# Patient Record
Sex: FEMALE | Race: ASIAN | NOT HISPANIC OR LATINO
[De-identification: names, ages, dates, MRNs, and addresses within clinical notes are randomized per-mention and may not be internally consistent; named-entity substitution may affect disease eponyms.]

---

## 2017-05-10 ENCOUNTER — APPOINTMENT (OUTPATIENT)
Dept: CARDIOLOGY | Facility: CLINIC | Age: 47
End: 2017-05-10

## 2017-05-17 ENCOUNTER — NON-APPOINTMENT (OUTPATIENT)
Age: 47
End: 2017-05-17

## 2017-05-17 ENCOUNTER — APPOINTMENT (OUTPATIENT)
Dept: CARDIOLOGY | Facility: CLINIC | Age: 47
End: 2017-05-17

## 2017-05-17 VITALS
BODY MASS INDEX: 29.59 KG/M2 | OXYGEN SATURATION: 98 % | SYSTOLIC BLOOD PRESSURE: 144 MMHG | RESPIRATION RATE: 17 BRPM | WEIGHT: 167 LBS | TEMPERATURE: 98.8 F | HEART RATE: 75 BPM | DIASTOLIC BLOOD PRESSURE: 89 MMHG | HEIGHT: 63 IN

## 2018-10-03 ENCOUNTER — APPOINTMENT (OUTPATIENT)
Dept: CARDIOLOGY | Facility: CLINIC | Age: 48
End: 2018-10-03
Payer: MEDICAID

## 2018-10-03 ENCOUNTER — NON-APPOINTMENT (OUTPATIENT)
Age: 48
End: 2018-10-03

## 2018-10-03 VITALS
RESPIRATION RATE: 17 BRPM | HEART RATE: 69 BPM | TEMPERATURE: 98.3 F | SYSTOLIC BLOOD PRESSURE: 152 MMHG | WEIGHT: 163 LBS | OXYGEN SATURATION: 98 % | BODY MASS INDEX: 28.87 KG/M2 | DIASTOLIC BLOOD PRESSURE: 95 MMHG

## 2018-10-03 PROCEDURE — 93306 TTE W/DOPPLER COMPLETE: CPT

## 2018-10-03 PROCEDURE — ZZZZZ: CPT

## 2018-10-03 PROCEDURE — 93015 CV STRESS TEST SUPVJ I&R: CPT

## 2018-10-03 PROCEDURE — 99214 OFFICE O/P EST MOD 30 MIN: CPT | Mod: 25

## 2018-10-03 PROCEDURE — 93000 ELECTROCARDIOGRAM COMPLETE: CPT | Mod: 59

## 2018-10-05 ENCOUNTER — RESULT REVIEW (OUTPATIENT)
Age: 48
End: 2018-10-05

## 2021-06-14 ENCOUNTER — APPOINTMENT (OUTPATIENT)
Dept: CARDIOLOGY | Facility: CLINIC | Age: 51
End: 2021-06-14
Payer: MEDICAID

## 2021-06-14 VITALS
HEART RATE: 77 BPM | OXYGEN SATURATION: 98 % | TEMPERATURE: 97.3 F | DIASTOLIC BLOOD PRESSURE: 77 MMHG | WEIGHT: 166 LBS | SYSTOLIC BLOOD PRESSURE: 127 MMHG | RESPIRATION RATE: 17 BRPM | BODY MASS INDEX: 29.41 KG/M2

## 2021-06-14 PROCEDURE — 93306 TTE W/DOPPLER COMPLETE: CPT

## 2021-06-14 PROCEDURE — 99072 ADDL SUPL MATRL&STAF TM PHE: CPT

## 2021-06-14 PROCEDURE — 93015 CV STRESS TEST SUPVJ I&R: CPT

## 2021-06-14 PROCEDURE — ZZZZZ: CPT

## 2021-06-14 PROCEDURE — 99214 OFFICE O/P EST MOD 30 MIN: CPT | Mod: 25

## 2021-06-14 NOTE — PHYSICAL EXAM
[General Appearance - Well Developed] : well developed [Normal Appearance] : normal appearance [Well Groomed] : well groomed [General Appearance - Well Nourished] : well nourished [No Deformities] : no deformities [General Appearance - In No Acute Distress] : no acute distress [Normal Conjunctiva] : the conjunctiva exhibited no abnormalities [Eyelids - No Xanthelasma] : the eyelids demonstrated no xanthelasmas [Normal Oral Mucosa] : normal oral mucosa [No Oral Pallor] : no oral pallor [No Oral Cyanosis] : no oral cyanosis [Normal Jugular Venous A Waves Present] : normal jugular venous A waves present [Normal Jugular Venous V Waves Present] : normal jugular venous V waves present [No Jugular Venous Griffiths A Waves] : no jugular venous griffiths A waves [Respiration, Rhythm And Depth] : normal respiratory rhythm and effort [Exaggerated Use Of Accessory Muscles For Inspiration] : no accessory muscle use [Auscultation Breath Sounds / Voice Sounds] : lungs were clear to auscultation bilaterally [Heart Rate And Rhythm] : heart rate and rhythm were normal [Heart Sounds] : normal S1 and S2 [Murmurs] : no murmurs present [Arterial Pulses Normal] : the arterial pulses were normal [Edema] : no peripheral edema present [Abdomen Soft] : soft [Abdomen Tenderness] : non-tender [Abdomen Mass (___ Cm)] : no abdominal mass palpated [Abnormal Walk] : normal gait [Gait - Sufficient For Exercise Testing] : the gait was sufficient for exercise testing [Nail Clubbing] : no clubbing of the fingernails [Cyanosis, Localized] : no localized cyanosis [Petechial Hemorrhages (___cm)] : no petechial hemorrhages [] : no ischemic changes [Oriented To Time, Place, And Person] : oriented to person, place, and time [Affect] : the affect was normal [Mood] : the mood was normal [No Anxiety] : not feeling anxious

## 2021-06-14 NOTE — REASON FOR VISIT
[Symptom and Test Evaluation] : symptom and test evaluation [FreeTextEntry1] : 10/3/18 - Patient reports that for the past 3 weeks she has been experiencing right-sided  chest discomfort, described as tightness, not related to exertion or to meals, non-tender to touch, worse when she is stressed. Patient denies SOB.  Patient denies palpitations.  Patient denies lightheadedness.

## 2021-06-14 NOTE — HISTORY OF PRESENT ILLNESS
[FreeTextEntry1] : 50-year-old female with HLD presents for followup.  \par \par Patient was last seen on 10/3/18 for chest pain.  Patient underwent an echocardiogram and it showed normal LV function without significant valvular pathology.  Patient underwent a treadmill stress test and completed 9 minutes of Brennon protocol.  There were upsloping ST depressions on ECG but no symptoms.  Patient underwent a CXR and it showed no lung pathology. Her symptom was felt to be GI in etiology.

## 2021-06-14 NOTE — DISCUSSION/SUMMARY
[FreeTextEntry1] : 48-year-old female with HLD presents for followup.  Patient was last seen on 5/17/17 for evaluation of chest pain.  Patient underwent an echocardiogram and it showed normal LV function without significant valvular pathology. Patient underwent a treadmill stress test and completed 9.5 minutes of Brennon protocol. There were upsloping ST depressions on ECG but no symptoms. Patient was felt to be at low risk for having significant CAD.  Patient reports that for the past 3 weeks she has been experiencing right-sided  chest discomfort, described as tightness, not related to exertion or to meals, non-tender to touch, worse when she is stressed. Patient denies SOB.  Patient denies palpitations.  Patient denies lightheadedness. \par \par (1) Chest discomfort, non-exertion - Patient underwent an echocardiogram and it showed normal LV function without significant valvular pathology. Patient underwent a treadmill stress test and completed 9 minutes of Brennon protocol.  There were upsloping ST depressions on ECG but no symptoms.  Patient appears to be at low risk for having significant CAD.  Patient underwent a CXR and it showed no lung pathology.  Patient was reassured.  Her symptom may be GI in etiology.  I advised patient to consider PPI.\par \par (2) Followup - as needed. \par

## 2021-06-23 ENCOUNTER — NON-APPOINTMENT (OUTPATIENT)
Age: 51
End: 2021-06-23

## 2021-07-02 ENCOUNTER — APPOINTMENT (OUTPATIENT)
Dept: INTERNAL MEDICINE | Facility: CLINIC | Age: 51
End: 2021-07-02
Payer: MEDICAID

## 2021-07-02 VITALS
SYSTOLIC BLOOD PRESSURE: 134 MMHG | OXYGEN SATURATION: 99 % | WEIGHT: 166 LBS | BODY MASS INDEX: 29.41 KG/M2 | HEART RATE: 79 BPM | RESPIRATION RATE: 17 BRPM | DIASTOLIC BLOOD PRESSURE: 80 MMHG | HEIGHT: 63 IN | TEMPERATURE: 98 F

## 2021-07-02 VITALS — SYSTOLIC BLOOD PRESSURE: 130 MMHG | DIASTOLIC BLOOD PRESSURE: 75 MMHG

## 2021-07-02 DIAGNOSIS — R03.0 ELEVATED BLOOD-PRESSURE READING, W/OUT DIAGNOSIS OF HYPERTENSION: ICD-10-CM

## 2021-07-02 DIAGNOSIS — M25.40 EFFUSION, UNSPECIFIED JOINT: ICD-10-CM

## 2021-07-02 DIAGNOSIS — M25.50 PAIN IN UNSPECIFIED JOINT: ICD-10-CM

## 2021-07-02 DIAGNOSIS — Z00.00 ENCOUNTER FOR GENERAL ADULT MEDICAL EXAMINATION W/OUT ABNORMAL FINDINGS: ICD-10-CM

## 2021-07-02 DIAGNOSIS — R07.89 OTHER CHEST PAIN: ICD-10-CM

## 2021-07-02 PROCEDURE — 99072 ADDL SUPL MATRL&STAF TM PHE: CPT

## 2021-07-02 PROCEDURE — 99203 OFFICE O/P NEW LOW 30 MIN: CPT | Mod: 25

## 2021-07-02 PROCEDURE — 99386 PREV VISIT NEW AGE 40-64: CPT

## 2021-07-02 RX ORDER — METFORMIN HYDROCHLORIDE 500 MG/1
500 TABLET, COATED ORAL DAILY
Qty: 30 | Refills: 2 | Status: DISCONTINUED | COMMUNITY
Start: 2021-07-02 | End: 2021-07-02

## 2021-07-07 ENCOUNTER — LABORATORY RESULT (OUTPATIENT)
Age: 51
End: 2021-07-07

## 2021-07-07 ENCOUNTER — APPOINTMENT (OUTPATIENT)
Dept: OBGYN | Facility: CLINIC | Age: 51
End: 2021-07-07
Payer: MEDICAID

## 2021-07-07 ENCOUNTER — OUTPATIENT (OUTPATIENT)
Dept: OUTPATIENT SERVICES | Facility: HOSPITAL | Age: 51
LOS: 1 days | End: 2021-07-07
Payer: COMMERCIAL

## 2021-07-07 ENCOUNTER — TRANSCRIPTION ENCOUNTER (OUTPATIENT)
Age: 51
End: 2021-07-07

## 2021-07-07 VITALS
RESPIRATION RATE: 18 BRPM | WEIGHT: 170 LBS | HEIGHT: 62 IN | SYSTOLIC BLOOD PRESSURE: 120 MMHG | OXYGEN SATURATION: 99 % | DIASTOLIC BLOOD PRESSURE: 76 MMHG | HEART RATE: 86 BPM | BODY MASS INDEX: 31.28 KG/M2

## 2021-07-07 DIAGNOSIS — Z11.3 ENCOUNTER FOR SCREENING FOR INFECTIONS WITH A PREDOMINANTLY SEXUAL MODE OF TRANSMISSION: ICD-10-CM

## 2021-07-07 DIAGNOSIS — Z01.419 ENCOUNTER FOR GYNECOLOGICAL EXAMINATION (GENERAL) (ROUTINE) W/OUT ABNORMAL FINDINGS: ICD-10-CM

## 2021-07-07 DIAGNOSIS — Z83.3 FAMILY HISTORY OF DIABETES MELLITUS: ICD-10-CM

## 2021-07-07 DIAGNOSIS — Z12.39 ENCOUNTER FOR OTHER SCREENING FOR MALIGNANT NEOPLASM OF BREAST: ICD-10-CM

## 2021-07-07 DIAGNOSIS — Z12.4 ENCOUNTER FOR SCREENING FOR MALIGNANT NEOPLASM OF CERVIX: ICD-10-CM

## 2021-07-07 DIAGNOSIS — Z82.3 FAMILY HISTORY OF STROKE: ICD-10-CM

## 2021-07-07 DIAGNOSIS — Z00.00 ENCOUNTER FOR GENERAL ADULT MEDICAL EXAMINATION WITHOUT ABNORMAL FINDINGS: ICD-10-CM

## 2021-07-07 DIAGNOSIS — Z12.11 ENCOUNTER FOR SCREENING FOR MALIGNANT NEOPLASM OF COLON: ICD-10-CM

## 2021-07-07 PROCEDURE — 99203 OFFICE O/P NEW LOW 30 MIN: CPT

## 2021-07-07 PROCEDURE — 87491 CHLMYD TRACH DNA AMP PROBE: CPT

## 2021-07-07 PROCEDURE — 87591 N.GONORRHOEAE DNA AMP PROB: CPT

## 2021-07-07 PROCEDURE — 36415 COLL VENOUS BLD VENIPUNCTURE: CPT

## 2021-07-07 PROCEDURE — 87800 DETECT AGNT MULT DNA DIREC: CPT

## 2021-07-07 PROCEDURE — G0463: CPT

## 2021-07-07 PROCEDURE — 87624 HPV HI-RISK TYP POOLED RSLT: CPT

## 2021-07-07 NOTE — HISTORY OF PRESENT ILLNESS
[N] : Patient is not sexually active [Y] : Positive pregnancy history [Mammogramdate] : 2020 [PapSmeardate] : 2020 [LMPDate] : 6/10/21 [PGHxTotal] : 2 [[facility]exFulerm] : 1 [PGHxPremature] : 0 [PGHxAbortions] : 1 [[facility] Center[facility]] : 1 [PGHxABInduced] : 0 [PGHxABSpont] : 0 [PGHxEctopic] : 0 [PGHxMultBirths] : 0 [Normal Amount/Duration] :  normal amount and duration [Regular Cycle Intervals] : periods have been regular [Frequency: Q ___ days] : menstrual periods occur approximately every [unfilled] days [Menarche Age: ____] : age at menarche was [unfilled] [FreeTextEntry1] : 06/10/21 [Previously active] : previously active [Men] : men

## 2021-07-07 NOTE — HISTORY OF PRESENT ILLNESS
[N] : Patient is not sexually active [Y] : Positive pregnancy history [Mammogramdate] : 2020 [PapSmeardate] : 2020 [LMPDate] : 6/10/21 [PGHxTotal] : 2 [[facility]ulerm] : 1 [PGHxPremature] : 0 [PGHxAbortions] : 1 [[facility])iving] : 1 [PGHxABInduced] : 0 [PGHxABSpont] : 0 [PGHxEctopic] : 0 [PGHxMultBirths] : 0 [Normal Amount/Duration] :  normal amount and duration [Regular Cycle Intervals] : periods have been regular [Frequency: Q ___ days] : menstrual periods occur approximately every [unfilled] days [Menarche Age: ____] : age at menarche was [unfilled] [FreeTextEntry1] : 06/10/21 [Previously active] : previously active [Men] : men

## 2021-07-08 DIAGNOSIS — Z82.3 FAMILY HISTORY OF STROKE: ICD-10-CM

## 2021-07-08 DIAGNOSIS — N39.3 STRESS INCONTINENCE (FEMALE) (MALE): ICD-10-CM

## 2021-07-08 DIAGNOSIS — Z11.3 ENCOUNTER FOR SCREENING FOR INFECTIONS WITH A PREDOMINANTLY SEXUAL MODE OF TRANSMISSION: ICD-10-CM

## 2021-07-08 DIAGNOSIS — E78.5 HYPERLIPIDEMIA, UNSPECIFIED: ICD-10-CM

## 2021-07-08 DIAGNOSIS — I10 ESSENTIAL (PRIMARY) HYPERTENSION: ICD-10-CM

## 2021-07-08 DIAGNOSIS — F32.9 MAJOR DEPRESSIVE DISORDER, SINGLE EPISODE, UNSPECIFIED: ICD-10-CM

## 2021-07-08 DIAGNOSIS — Z12.4 ENCOUNTER FOR SCREENING FOR MALIGNANT NEOPLASM OF CERVIX: ICD-10-CM

## 2021-07-08 DIAGNOSIS — Z83.3 FAMILY HISTORY OF DIABETES MELLITUS: ICD-10-CM

## 2021-07-08 DIAGNOSIS — Z01.419 ENCOUNTER FOR GYNECOLOGICAL EXAMINATION (GENERAL) (ROUTINE) WITHOUT ABNORMAL FINDINGS: ICD-10-CM

## 2021-07-08 DIAGNOSIS — E11.9 TYPE 2 DIABETES MELLITUS WITHOUT COMPLICATIONS: ICD-10-CM

## 2021-07-09 ENCOUNTER — NON-APPOINTMENT (OUTPATIENT)
Age: 51
End: 2021-07-09

## 2021-07-09 DIAGNOSIS — N76.1 SUBACUTE AND CHRONIC VAGINITIS: ICD-10-CM

## 2021-07-09 LAB
C TRACH RRNA SPEC QL NAA+PROBE: NOT DETECTED
HPV HIGH+LOW RISK DNA PNL CVX: NOT DETECTED
N GONORRHOEA RRNA SPEC QL NAA+PROBE: NOT DETECTED
SOURCE TP AMPLIFICATION: NORMAL

## 2021-07-12 ENCOUNTER — APPOINTMENT (OUTPATIENT)
Dept: ENDOCRINOLOGY | Facility: CLINIC | Age: 51
End: 2021-07-12
Payer: MEDICAID

## 2021-07-12 PROCEDURE — 97802 MEDICAL NUTRITION INDIV IN: CPT

## 2021-07-12 PROCEDURE — 99072 ADDL SUPL MATRL&STAF TM PHE: CPT

## 2021-07-13 LAB — CYTOLOGY CVX/VAG DOC THIN PREP: NORMAL

## 2021-07-21 ENCOUNTER — APPOINTMENT (OUTPATIENT)
Dept: PODIATRY | Facility: CLINIC | Age: 51
End: 2021-07-21

## 2021-07-27 ENCOUNTER — APPOINTMENT (OUTPATIENT)
Dept: UROGYNECOLOGY | Facility: CLINIC | Age: 51
End: 2021-07-27
Payer: MEDICAID

## 2021-07-27 VITALS
BODY MASS INDEX: 29.44 KG/M2 | TEMPERATURE: 97.2 F | HEART RATE: 70 BPM | HEIGHT: 62 IN | DIASTOLIC BLOOD PRESSURE: 76 MMHG | SYSTOLIC BLOOD PRESSURE: 135 MMHG | WEIGHT: 160 LBS

## 2021-07-27 DIAGNOSIS — R35.0 FREQUENCY OF MICTURITION: ICD-10-CM

## 2021-07-27 LAB
BILIRUB UR QL STRIP: NORMAL
CLARITY UR: CLEAR
COLLECTION METHOD: NORMAL
GLUCOSE UR-MCNC: 100
HCG UR QL: 0.2 EU/DL
HGB UR QL STRIP.AUTO: NORMAL
KETONES UR-MCNC: NORMAL
LEUKOCYTE ESTERASE UR QL STRIP: NORMAL
NITRITE UR QL STRIP: NORMAL
PH UR STRIP: 5.5
PROT UR STRIP-MCNC: NORMAL
SP GR UR STRIP: 1.03

## 2021-07-27 PROCEDURE — 99072 ADDL SUPL MATRL&STAF TM PHE: CPT

## 2021-07-27 PROCEDURE — 51725 SIMPLE CYSTOMETROGRAM: CPT

## 2021-07-27 PROCEDURE — 51736 URINE FLOW MEASUREMENT: CPT

## 2021-07-27 PROCEDURE — 99205 OFFICE O/P NEW HI 60 MIN: CPT | Mod: 25

## 2021-07-27 NOTE — HISTORY OF PRESENT ILLNESS
[FreeTextEntry1] : \par \par Translater gely #[id number]  Ieve\par \par This is a 51-year-old woman with a chief complaint of urinary incontinence. Stress and urge symptoms are both present.\par \par She was previously seen by Dr. Ellis and did not respond to oxybutinin.  At the time she declined surgery\par \par On examination the general examination is normal. External genitalia are normal. No evidence of prolapse.  Vagina normal. \par \par Urine neg except for glucose\par \par \par PROCEDURE: SIMPLE CYSTOMETRY\par \par In the supine position, the urethra was prepped with Betadine. A 16 Jordanian catheter was gently passed into the bladder with sterile technique and secured in place. Sterile water was infused by gravity via an irrigation syringe. Patient sensation, change in flow rate, and capacity were observed: \par \par \par First Sensation  50 cc\par , First Urgency (cc): 150___________\par Increased Urgency  180\par Maximal capacity (cc): __330___________\par Pain will filling: NEGATIVE\par Sensory Urgency: strong positive\par Post Fill Void (cc):   ______________\par \par Cough test negative\par \par Involuntary detrusor contractions:   equivocal favoring contractions present\par \par Specific diagnosis for etiology of incontinence is not distinctly clear from simple testing as indication for cystometry.  Instructions given for kegel exercises for stress incontinence. Counseled on sling but declines surgery\par \par This patient has signs and symptoms consistent with an overactive bladder. By history has stress incontinence as well - not demonstrated today.  Declines surgery  We discussed the nature of the condition and is discussed stress incontinence and overactive bladder, as well as mixed conditions of incontinence.\par \par I recommended her treatment regimen to include:\par \par Timed voiding\par Dietary modifications including restrictions on caffeine, diet sweetener, spicy, alcohol, citrus\par We discussed normal hydration and avoiding overhydration\par For general bladder health I recommended Cranberry tabs 400 mg twice daily.\par \par She was offered to try the above regimen for a few weeks first or concomitantly to begin Sanctura 60 mg xl with a stool softener at the start. Risks, benefits, and adverse reactions for the medication were reviewed.\par \par We will follow up  up in 4-6 weeks\par \par \par \par IMPRESSION\par

## 2021-08-09 ENCOUNTER — APPOINTMENT (OUTPATIENT)
Dept: INTERNAL MEDICINE | Facility: CLINIC | Age: 51
End: 2021-08-09
Payer: MEDICAID

## 2021-08-09 VITALS
DIASTOLIC BLOOD PRESSURE: 84 MMHG | WEIGHT: 166 LBS | OXYGEN SATURATION: 98 % | HEIGHT: 62 IN | SYSTOLIC BLOOD PRESSURE: 137 MMHG | HEART RATE: 87 BPM | BODY MASS INDEX: 30.55 KG/M2 | RESPIRATION RATE: 16 BRPM | TEMPERATURE: 97.2 F

## 2021-08-09 DIAGNOSIS — N39.3 STRESS INCONTINENCE (FEMALE) (MALE): ICD-10-CM

## 2021-08-09 DIAGNOSIS — A04.8 OTHER SPECIFIED BACTERIAL INTESTINAL INFECTIONS: ICD-10-CM

## 2021-08-09 PROCEDURE — 99072 ADDL SUPL MATRL&STAF TM PHE: CPT

## 2021-08-09 PROCEDURE — 99214 OFFICE O/P EST MOD 30 MIN: CPT

## 2021-08-09 NOTE — ASSESSMENT
[FreeTextEntry1] : COVID19 SHOT X2 LAST SHOT IN 03/2021\par \par lab as ordered \par \par anxiety and depression: \par pt is also anxious during the exam; \par \par \par htn: COTNINUE WITH CURRENT AMLODIPINE AND LOSARTAN\par BP log\par 1 month follow up \par \par \par DMII: \par \par continue with current meds \par healthy diet and exercise\par refer to podiatry and dietitian\par \par \par chest pain; had work up done by cardio with TTE and stress and EKG and cxr  grossly wnl \par \par joints pain and swelling : \par \par follow up with labs to r/o rheumatoid disease \par \par \par thumb pain and stiffness: \par no red flag symptoms\par follow up with X ray;\par diclofenac cream as needed\par  ice and rest;\par \par \par thyroid us and labs for thyroid nodule \par \par 1 months follow up\par \par  thyroid nodule;\par \par follow up with TFT \par thyroid US\par \par \par advised pt tobring colonoscopy and egd reporte next visit she had it done in 01/2020

## 2021-08-09 NOTE — HISTORY OF PRESENT ILLNESS
[de-identified] : 51 y.o chinese speaking F w/pmhX of prior CP, URINARY incontinent , htn, hld, DMII  HER TO ESTABLISH CARe AND cpe\par \par PRIOR PCP WAS ( [phone]) in [place]; \par \par Chest pain saw cardio . Patient underwent an echocardiogram and it showed normal LV function without significant valvular pathology. Patient underwent a treadmill stress test and completed 9.5 minutes of Brennon protocol. There were upsloping ST depressions on ECG but no symptoms. Patient was felt to be at low risk for having significant CAD and CXR negative;\par last TTE in 06/2021 wnl \par \par dmii: LAST TIME SAW Ophthal WAS IN 03/2021; normal per pt; has not see podiatry or dietitian \par \par HTN: home BP log with BP 110s-130s/ 80s ; denies of nay symptoms \par \par HLD: was on fish oil \par \par B/L HAND JOINTS PAIN AND THUMB PAIN: for 1 -2 months; was seeing Dr.Wensong Dutta PMR( [phone]) WAS on PHYSICAL Therapy; AND ON Gabapentin; PT WAS NOT ABLE TO TOLERATE WITH Gabapentin; SHE had SOB AND cp WHEN SHE WAS ON Gabapentin; NO TRAUMA OR INJURY ;NO Weakness OR Numbness; \par \par HAD X RAY LUMBAR ADN CERVICAL DONE BY pmr IN 06/2021 WITH GROSSLY NEGATIVE RESULTS Besides SOME MILD Degenerative CHANGE OF Lumbar DISC \par \par WAS ON STRESS DUE TO PT'S STEP Parent  WAS SICK IN FAMILY\par \par anxiety and DEPRESSION : GAD7 =17  phq 7 =11 no jonathan/ no hallucination/ No delusions /no harmful ideation/ HAS Suicidal IDEATION ONCE BUT NO SPECIFIC PLAN \par

## 2021-08-09 NOTE — HEALTH RISK ASSESSMENT
[Employed] : employed [1 or 2 (0 pts)] : 1 or 2 (0 points) [Never (0 pts)] : Never (0 points) [No] : In the past 12 months have you used drugs other than those required for medical reasons? No [1] : 1) Little interest or pleasure doing things for several days (1) [3] : 2) Feeling down, depressed, or hopeless for nearly every day (3) [Patient reported mammogram was normal] : Patient reported mammogram was normal [Patient reported PAP Smear was normal] : Patient reported PAP Smear was normal [HIV test declined] : HIV test declined [Hepatitis C test offered] : Hepatitis C test offered [] :  [Feels Safe at Home] : Feels safe at home [Patient reported colonoscopy was normal] : Patient reported colonoscopy was normal [] : No [[id number]] : 4 [Change in mental status noted] : No change in mental status noted [Language] : denies difficulty with language [Behavior] : denies difficulty with behavior [Learning/Retaining New Information] : denies difficulty learning/retaining new information [Handling Complex Tasks] : denies difficulty handling complex tasks [Reasoning] : denies difficulty with reasoning [Spatial Ability and Orientation] : denies difficulty with spatial ability and orientation [Sexually Active] : not sexually active [High Risk Behavior] : no high risk behavior [MammogramDate] : 06/2020 [PapSmearDate] : 01/2020 [ColonoscopyDate] : 01/2020 [ColonoscopyComments] : NORMAL PER PT;  [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

## 2021-08-09 NOTE — REVIEW OF SYSTEMS
Name from pharmacy: Lisinopril Oral Tablet 5 MG         Will file in chart as: LISINOPRIL 5 MG Oral Tab    Sig: TAKE 1 TABLET BY MOUTH ONE TIME A DAY     Disp:  90 tablet    Refills:  0    Start: 6/27/2021    Class: Normal    Non-formulary    Last ordered [Negative] : Heme/Lymph

## 2021-08-09 NOTE — PHYSICAL EXAM
[No Acute Distress] : no acute distress [Well Nourished] : well nourished [Well Developed] : well developed [Well-Appearing] : well-appearing [Normal Sclera/Conjunctiva] : normal sclera/conjunctiva [PERRL] : pupils equal round and reactive to light [EOMI] : extraocular movements intact [Normal Outer Ear/Nose] : the outer ears and nose were normal in appearance [Normal Oropharynx] : the oropharynx was normal [No JVD] : no jugular venous distention [No Lymphadenopathy] : no lymphadenopathy [Supple] : supple [Thyroid Normal, No Nodules] : the thyroid was normal and there were no nodules present [No Respiratory Distress] : no respiratory distress  [No Accessory Muscle Use] : no accessory muscle use [Clear to Auscultation] : lungs were clear to auscultation bilaterally [Normal Rate] : normal rate  [Regular Rhythm] : with a regular rhythm [Normal S1, S2] : normal S1 and S2 [No Murmur] : no murmur heard [No Carotid Bruits] : no carotid bruits [No Abdominal Bruit] : a ~M bruit was not heard ~T in the abdomen [No Varicosities] : no varicosities [Pedal Pulses Present] : the pedal pulses are present [No Edema] : there was no peripheral edema [No Palpable Aorta] : no palpable aorta [No Extremity Clubbing/Cyanosis] : no extremity clubbing/cyanosis [Soft] : abdomen soft [Non Tender] : non-tender [Non-distended] : non-distended [No Masses] : no abdominal mass palpated [No HSM] : no HSM [Normal Bowel Sounds] : normal bowel sounds [Normal Posterior Cervical Nodes] : no posterior cervical lymphadenopathy [Normal Anterior Cervical Nodes] : no anterior cervical lymphadenopathy [No CVA Tenderness] : no CVA  tenderness [No Spinal Tenderness] : no spinal tenderness [No Joint Swelling] : no joint swelling [Grossly Normal Strength/Tone] : grossly normal strength/tone [No Rash] : no rash [Coordination Grossly Intact] : coordination grossly intact [No Focal Deficits] : no focal deficits [Normal Gait] : normal gait [Deep Tendon Reflexes (DTR)] : deep tendon reflexes were 2+ and symmetric [Normal Affect] : the affect was normal [Normal Insight/Judgement] : insight and judgment were intact [de-identified] : Right thumb stiffness

## 2021-08-09 NOTE — PHYSICAL EXAM
[No Acute Distress] : no acute distress [Well Nourished] : well nourished [Well Developed] : well developed [Well-Appearing] : well-appearing [Normal Sclera/Conjunctiva] : normal sclera/conjunctiva [PERRL] : pupils equal round and reactive to light [EOMI] : extraocular movements intact [Normal Outer Ear/Nose] : the outer ears and nose were normal in appearance [Normal Oropharynx] : the oropharynx was normal [No JVD] : no jugular venous distention [No Lymphadenopathy] : no lymphadenopathy [Supple] : supple [Thyroid Normal, No Nodules] : the thyroid was normal and there were no nodules present [No Respiratory Distress] : no respiratory distress  [No Accessory Muscle Use] : no accessory muscle use [Clear to Auscultation] : lungs were clear to auscultation bilaterally [Normal Rate] : normal rate  [Regular Rhythm] : with a regular rhythm [Normal S1, S2] : normal S1 and S2 [No Murmur] : no murmur heard [No Carotid Bruits] : no carotid bruits [No Abdominal Bruit] : a ~M bruit was not heard ~T in the abdomen [No Varicosities] : no varicosities [Pedal Pulses Present] : the pedal pulses are present [No Edema] : there was no peripheral edema [No Palpable Aorta] : no palpable aorta [No Extremity Clubbing/Cyanosis] : no extremity clubbing/cyanosis [Soft] : abdomen soft [Non Tender] : non-tender [Non-distended] : non-distended [No Masses] : no abdominal mass palpated [No HSM] : no HSM [Normal Bowel Sounds] : normal bowel sounds [Normal Posterior Cervical Nodes] : no posterior cervical lymphadenopathy [Normal Anterior Cervical Nodes] : no anterior cervical lymphadenopathy [No CVA Tenderness] : no CVA  tenderness [No Spinal Tenderness] : no spinal tenderness [No Joint Swelling] : no joint swelling [Grossly Normal Strength/Tone] : grossly normal strength/tone [No Rash] : no rash [Coordination Grossly Intact] : coordination grossly intact [No Focal Deficits] : no focal deficits [Normal Gait] : normal gait [Deep Tendon Reflexes (DTR)] : deep tendon reflexes were 2+ and symmetric [Normal Affect] : the affect was normal [Normal Insight/Judgement] : insight and judgment were intact [de-identified] : Right thumb stiffness

## 2021-08-09 NOTE — HEALTH RISK ASSESSMENT
[1 or 2 (0 pts)] : 1 or 2 (0 points) [Never (0 pts)] : Never (0 points) [No] : In the past 12 months have you used drugs other than those required for medical reasons? No [1] : 1) Little interest or pleasure doing things for several days (1) [3] : 2) Feeling down, depressed, or hopeless for nearly every day (3) [Patient reported mammogram was normal] : Patient reported mammogram was normal [Patient reported PAP Smear was normal] : Patient reported PAP Smear was normal [HIV test declined] : HIV test declined [Hepatitis C test offered] : Hepatitis C test offered [Employed] : employed [] :  [Feels Safe at Home] : Feels safe at home [Patient reported colonoscopy was abnormal] : Patient reported colonoscopy was abnormal [] : No [[id number]] : 4 [Change in mental status noted] : No change in mental status noted [Language] : denies difficulty with language [Behavior] : denies difficulty with behavior [Learning/Retaining New Information] : denies difficulty learning/retaining new information [Handling Complex Tasks] : denies difficulty handling complex tasks [Reasoning] : denies difficulty with reasoning [Spatial Ability and Orientation] : denies difficulty with spatial ability and orientation [Sexually Active] : not sexually active [High Risk Behavior] : no high risk behavior [MammogramDate] : 07/2021 [PapSmearDate] : 07/2021 [ColonoscopyDate] : 01/2020 [ColonoscopyComments] : REPEAT IN 2025; [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

## 2021-08-09 NOTE — HISTORY OF PRESENT ILLNESS
[de-identified] : 51 y.o chinese speaking F w/pmhX of prior CP, URINARY incontinent , HTN, HLD, DMII, chronic thumb pain, anxiety and depression  HERE TO follow up;\par \par PRIOR PCP WAS ( [phone]) in [place]; \par \par Chest pain saw cardio . Patient underwent an echocardiogram and it showed normal LV function without significant valvular pathology. Patient underwent a treadmill stress test and completed 9.5 minutes of Brennon protocol. There were upsloping ST depressions on ECG but no symptoms. Patient was felt to be at low risk for having significant CAD and CXR negative;\par last TTE in 06/2021 wnl \par \par dmii: LAST TIME SAW Ophthal WAS IN 03/2021; normal per pt; saw dietitian already has not see podiatry \par \par HTN: home BP log with BP 110s-130s/ 80s ; denies of nay symptoms \par \par HLD: was on fish oil \par \par B/L HAND JOINTS PAIN AND THUMB PAIN: for 1 -2 months; was seeing Dr.Wensong Dutta PMR( [phone]) WAS on PHYSICAL Therapy; AND ON Gabapentin; PT WAS NOT ABLE TO TOLERATE WITH Gabapentin; SHE had SOB AND cp WHEN SHE WAS ON Gabapentin; NO TRaUMA OR INJURY ;NO Weakness OR Numbness; x RAY HAND WNL; LABS DOSE NOT Indicate ANY RHEUMATOID DISEASE;\par \par HAD X RAY LUMBAR AND CERVICAL DONE BY PMR IN 06/2021 WITH GROSSLY NEGATIVE RESULTS Besides SOME MILD DGENERATIVE CHANGE OF Lumbar DISC \par \par WAS ON STRESS DUE TO PT'S STEP Parent  WAS SICK IN FAMILY\par \par anxiety and DEPRESSION : GAD7 =17  phq 7 =11 no jonathan/ no hallucination/ No delusions /no harmful ideation/ HAS Suicidal IDEATION ONCE BUT NO SPECIFIC PLAN \par \par thyroid nodule: has not do thyroid US; \par \par \par saw UROGYN on meds for stress incontinent; \par

## 2021-08-09 NOTE — ASSESSMENT
[FreeTextEntry1] : COVID19 SHOT X2 LAST SHOT IN 03/2021\par \par labs done in 07/2021 discussed with pt in details \par \par anxiety and depression: \par pt is also anxious during the exam; \par option of treatment discussed with pt again \par PT REFUSE TO take MEDS DESPITE Recommendation; \par REFER TO Psychologist AND Psychiatry again \par \par \par HTN: pt did not take  losartan SELF STOPPED\par Advised WITH Compliance WITH Amlodipine AND LOSARTAN;\par  bp LOG\par  1 MONTHS FOLLOW UP \par \par \par DMII: well controlled \par saw dietitian\par continue with current meds \par healthy diet and exercise\par has not see ophthal and podiatry \par \par HLD not well controlled'; need to add statin;\par 1 month follow up for CMP \par \par \par \par \par \par chest pain; had work up doen by cardio with TTE and stress and EKG and cxr \par \par joints pain and swelling : \par \par  labs to r/o rheumatoid disease wnl; \par \par \par thumb pain and stiffness: \par \par no red flag symptoms\par \par  X ray HAND WNL; \par diclofenac cream as needed\par  ice and rest;\par REFER TO HAND SURGERY\par \par \par  thyroid nodule;\par follow up with TFT \par thyroid US\par \par \par 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

## 2021-08-25 ENCOUNTER — APPOINTMENT (OUTPATIENT)
Dept: INTERNAL MEDICINE | Facility: CLINIC | Age: 51
End: 2021-08-25

## 2021-08-25 LAB
25(OH)D3 SERPL-MCNC: 28.4 NG/ML
ALBUMIN SERPL ELPH-MCNC: 4.4 G/DL
ALP BLD-CCNC: 75 U/L
ALT SERPL-CCNC: 55 U/L
ANA SER IF-ACNC: NEGATIVE
ANION GAP SERPL CALC-SCNC: 15 MMOL/L
APPEARANCE: ABNORMAL
AST SERPL-CCNC: 36 U/L
BASOPHILS # BLD AUTO: 0.03 K/UL
BASOPHILS NFR BLD AUTO: 0.5 %
BILIRUB SERPL-MCNC: 0.5 MG/DL
BILIRUBIN URINE: NEGATIVE
BLOOD URINE: NEGATIVE
BUN SERPL-MCNC: 10 MG/DL
CALCIUM SERPL-MCNC: 9.1 MG/DL
CCP AB SER IA-ACNC: <8 UNITS
CHLORIDE SERPL-SCNC: 104 MMOL/L
CHOLEST SERPL-MCNC: 207 MG/DL
CO2 SERPL-SCNC: 22 MMOL/L
COLOR: YELLOW
CREAT SERPL-MCNC: 0.5 MG/DL
CREAT SPEC-SCNC: 192 MG/DL
CRP SERPL-MCNC: <3 MG/L
DSDNA AB SER-ACNC: <12 IU/ML
ENA RNP AB SER IA-ACNC: <0.2 AL
ENA SM AB SER IA-ACNC: <0.2 AL
EOSINOPHIL # BLD AUTO: 0.09 K/UL
EOSINOPHIL NFR BLD AUTO: 1.6 %
ERYTHROCYTE [SEDIMENTATION RATE] IN BLOOD BY WESTERGREN METHOD: 18 MM/HR
ESTIMATED AVERAGE GLUCOSE: 126 MG/DL
GLUCOSE QUALITATIVE U: NEGATIVE
GLUCOSE SERPL-MCNC: 115 MG/DL
H PYLORI AG STL QL: NOT DETECTED
HBA1C MFR BLD HPLC: 6 %
HBV SURFACE AB SER QL: REACTIVE
HBV SURFACE AG SER QL: NONREACTIVE
HCT VFR BLD CALC: 42.6 %
HCV AB SER QL: NONREACTIVE
HCV S/CO RATIO: 0.08 S/CO
HDLC SERPL-MCNC: 50 MG/DL
HGB BLD-MCNC: 13.8 G/DL
IMM GRANULOCYTES NFR BLD AUTO: 0.2 %
KETONES URINE: NEGATIVE
LDLC SERPL CALC-MCNC: 116 MG/DL
LEUKOCYTE ESTERASE URINE: NEGATIVE
LYMPHOCYTES # BLD AUTO: 1.53 K/UL
LYMPHOCYTES NFR BLD AUTO: 27 %
MAN DIFF?: NORMAL
MCHC RBC-ENTMCNC: 31.5 PG
MCHC RBC-ENTMCNC: 32.4 GM/DL
MCV RBC AUTO: 97.3 FL
MICROALBUMIN 24H UR DL<=1MG/L-MCNC: 1.6 MG/DL
MICROALBUMIN/CREAT 24H UR-RTO: 8 MG/G
MONOCYTES # BLD AUTO: 0.39 K/UL
MONOCYTES NFR BLD AUTO: 6.9 %
NEUTROPHILS # BLD AUTO: 3.62 K/UL
NEUTROPHILS NFR BLD AUTO: 63.8 %
NITRITE URINE: NEGATIVE
NONHDLC SERPL-MCNC: 157 MG/DL
PH URINE: 6
PLATELET # BLD AUTO: 217 K/UL
POTASSIUM SERPL-SCNC: 4.5 MMOL/L
PROT SERPL-MCNC: 6.9 G/DL
PROTEIN URINE: NORMAL
RBC # BLD: 4.38 M/UL
RBC # FLD: 11.8 %
RF+CCP IGG SER-IMP: NEGATIVE
RHEUMATOID FACT SER QL: <10 IU/ML
SODIUM SERPL-SCNC: 141 MMOL/L
SPECIFIC GRAVITY URINE: 1.02
T PALLIDUM AB SER QL IA: NEGATIVE
TRIGL SERPL-MCNC: 204 MG/DL
TSH SERPL-ACNC: 2.52 UIU/ML
UROBILINOGEN URINE: NORMAL
WBC # FLD AUTO: 5.67 K/UL

## 2021-08-27 ENCOUNTER — APPOINTMENT (OUTPATIENT)
Dept: INTERNAL MEDICINE | Facility: CLINIC | Age: 51
End: 2021-08-27
Payer: MEDICAID

## 2021-08-27 VITALS
BODY MASS INDEX: 29.81 KG/M2 | WEIGHT: 162 LBS | HEART RATE: 86 BPM | OXYGEN SATURATION: 97 % | HEIGHT: 62 IN | RESPIRATION RATE: 16 BRPM | SYSTOLIC BLOOD PRESSURE: 143 MMHG | TEMPERATURE: 97 F | DIASTOLIC BLOOD PRESSURE: 82 MMHG

## 2021-08-27 DIAGNOSIS — Z02.1 ENCOUNTER FOR PRE-EMPLOYMENT EXAMINATION: ICD-10-CM

## 2021-08-27 DIAGNOSIS — M79.643 PAIN IN UNSPECIFIED HAND: ICD-10-CM

## 2021-08-27 DIAGNOSIS — Z23 ENCOUNTER FOR IMMUNIZATION: ICD-10-CM

## 2021-08-27 PROCEDURE — 99214 OFFICE O/P EST MOD 30 MIN: CPT

## 2021-08-27 PROCEDURE — 99072 ADDL SUPL MATRL&STAF TM PHE: CPT

## 2021-08-27 NOTE — PHYSICAL EXAM
[No Acute Distress] : no acute distress [Well Nourished] : well nourished [Well Developed] : well developed [Well-Appearing] : well-appearing [Normal Sclera/Conjunctiva] : normal sclera/conjunctiva [EOMI] : extraocular movements intact [PERRL] : pupils equal round and reactive to light [Normal Outer Ear/Nose] : the outer ears and nose were normal in appearance [Normal Oropharynx] : the oropharynx was normal [No JVD] : no jugular venous distention [No Lymphadenopathy] : no lymphadenopathy [Supple] : supple [Thyroid Normal, No Nodules] : the thyroid was normal and there were no nodules present [No Respiratory Distress] : no respiratory distress  [No Accessory Muscle Use] : no accessory muscle use [Clear to Auscultation] : lungs were clear to auscultation bilaterally [Normal Rate] : normal rate  [Regular Rhythm] : with a regular rhythm [Normal S1, S2] : normal S1 and S2 [No Murmur] : no murmur heard [No Carotid Bruits] : no carotid bruits [No Abdominal Bruit] : a ~M bruit was not heard ~T in the abdomen [No Varicosities] : no varicosities [Pedal Pulses Present] : the pedal pulses are present [No Edema] : there was no peripheral edema [No Palpable Aorta] : no palpable aorta [No Extremity Clubbing/Cyanosis] : no extremity clubbing/cyanosis [Soft] : abdomen soft [Non Tender] : non-tender [Non-distended] : non-distended [No Masses] : no abdominal mass palpated [No HSM] : no HSM [Normal Bowel Sounds] : normal bowel sounds [Normal Posterior Cervical Nodes] : no posterior cervical lymphadenopathy [Normal Anterior Cervical Nodes] : no anterior cervical lymphadenopathy [No CVA Tenderness] : no CVA  tenderness [No Spinal Tenderness] : no spinal tenderness [No Joint Swelling] : no joint swelling [Grossly Normal Strength/Tone] : grossly normal strength/tone [No Rash] : no rash [Coordination Grossly Intact] : coordination grossly intact [No Focal Deficits] : no focal deficits [Normal Gait] : normal gait [Deep Tendon Reflexes (DTR)] : deep tendon reflexes were 2+ and symmetric [Normal Affect] : the affect was normal [Normal Insight/Judgement] : insight and judgment were intact [de-identified] : Right thumb stiffness

## 2021-08-27 NOTE — HISTORY OF PRESENT ILLNESS
[de-identified] : 51 y.o chinese speaking F w/pmhX of prior CP, URINARY incontinent , HTN, HLD, DMII, chronic thumb pain, anxiety and depression  HERE TO follow up;\par \par PRIOR PCP WAS ( [phone]) in [place]; \par \par Chest pain saw cardio . Patient underwent an echocardiogram and it showed normal LV function without significant valvular pathology. Patient underwent a treadmill stress test and completed 9.5 minutes of Brennon protocol. There were upsloping ST depressions on ECG but no symptoms. Patient was felt to be at low risk for having significant CAD and CXR negative;\par last TTE in 06/2021 wnl \par \par dmii: LAST TIME SAW Ophthal WAS IN 03/2021; normal per pt; saw dietitian already has not see podiatry \par \par HTN: home BP log with BP 110s-130s/ 80s ; denies of any symptoms; again did not take home BP meds; denies of any symptoms \par \par HLD: was on fish oil \par \par B/L HAND JOINTS PAIN AND THUMB PAIN: for 1 -2 months; was seeing Dr.Wensong Dutta PMR( [phone]) WAS on PHYSICAL Therapy; AND ON Gabapentin; PT WAS NOT ABLE TO TOLERATE WITH Gabapentin; SHE had SOB AND cp WHEN SHE WAS ON Gabapentin; NO TRaUMA OR INJURY ;NO Weakness OR Numbness; x RAY HAND WNL; LABS DOSE NOT Indicate ANY RHEUMATOID DISEASE;\par \par HAD X RAY LUMBAR AND CERVICAL DONE BY PMR IN 06/2021 WITH GROSSLY NEGATIVE RESULTS Besides SOME MILD DGENERATIVE CHANGE OF Lumbar DISC \par \par WAS ON STRESS DUE TO PT'S STEP Parent  WAS SICK IN FAMILY\par \par anxiety and DEPRESSION : GAD7 =17  phq 7 =11 no jonathan/ no hallucination/ No delusions /no harmful ideation/ HAS Suicidal IDEATION ONCE BUT NO SPECIFIC PLAN \par \par thyroid nodule: has not do thyroid US; \par \par saw UROGYN on meds for stress incontinent; \par

## 2021-08-27 NOTE — HEALTH RISK ASSESSMENT
[1 or 2 (0 pts)] : 1 or 2 (0 points) [Never (0 pts)] : Never (0 points) [No] : In the past 12 months have you used drugs other than those required for medical reasons? No [1] : 1) Little interest or pleasure doing things for several days (1) [3] : 2) Feeling down, depressed, or hopeless for nearly every day (3) [Patient reported mammogram was normal] : Patient reported mammogram was normal [Patient reported PAP Smear was normal] : Patient reported PAP Smear was normal [Patient reported colonoscopy was abnormal] : Patient reported colonoscopy was abnormal [HIV test declined] : HIV test declined [Hepatitis C test offered] : Hepatitis C test offered [Employed] : employed [] :  [Feels Safe at Home] : Feels safe at home [[id number]] : 4 [] : No [Change in mental status noted] : No change in mental status noted [Language] : denies difficulty with language [Behavior] : denies difficulty with behavior [Handling Complex Tasks] : denies difficulty handling complex tasks [Learning/Retaining New Information] : denies difficulty learning/retaining new information [Reasoning] : denies difficulty with reasoning [Spatial Ability and Orientation] : denies difficulty with spatial ability and orientation [Sexually Active] : not sexually active [High Risk Behavior] : no high risk behavior [MammogramDate] : 07/2021 [PapSmearDate] : 07/2021 [ColonoscopyDate] : 01/2020 [ColonoscopyComments] : REPEAT IN 2025; [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

## 2021-09-01 LAB
M TB IFN-G BLD-IMP: NEGATIVE
MEV IGG FLD QL IA: >300 AU/ML
MEV IGG+IGM SER-IMP: POSITIVE
MUV AB SER-ACNC: POSITIVE
MUV IGG SER QL IA: 160 AU/ML
QUANTIFERON TB PLUS MITOGEN MINUS NIL: 4.6 IU/ML
QUANTIFERON TB PLUS NIL: 0.11 IU/ML
QUANTIFERON TB PLUS TB1 MINUS NIL: -0.01 IU/ML
QUANTIFERON TB PLUS TB2 MINUS NIL: 0.08 IU/ML
RUBV IGG FLD-ACNC: 8.9 INDEX
RUBV IGG SER-IMP: POSITIVE
VZV AB TITR SER: POSITIVE
VZV IGG SER IF-ACNC: >4000 INDEX

## 2021-09-03 ENCOUNTER — OUTPATIENT (OUTPATIENT)
Dept: OUTPATIENT SERVICES | Facility: HOSPITAL | Age: 51
LOS: 1 days | End: 2021-09-03
Payer: COMMERCIAL

## 2021-09-03 ENCOUNTER — APPOINTMENT (OUTPATIENT)
Dept: ULTRASOUND IMAGING | Facility: HOSPITAL | Age: 51
End: 2021-09-03

## 2021-09-03 DIAGNOSIS — E04.1 NONTOXIC SINGLE THYROID NODULE: ICD-10-CM

## 2021-09-03 PROCEDURE — 76536 US EXAM OF HEAD AND NECK: CPT

## 2021-09-03 PROCEDURE — 76536 US EXAM OF HEAD AND NECK: CPT | Mod: 26

## 2021-09-08 ENCOUNTER — APPOINTMENT (OUTPATIENT)
Dept: INTERNAL MEDICINE | Facility: CLINIC | Age: 51
End: 2021-09-08

## 2021-09-25 ENCOUNTER — APPOINTMENT (OUTPATIENT)
Dept: INTERNAL MEDICINE | Facility: CLINIC | Age: 51
End: 2021-09-25
Payer: MEDICAID

## 2021-09-25 VITALS
HEART RATE: 75 BPM | TEMPERATURE: 97.9 F | BODY MASS INDEX: 30.18 KG/M2 | RESPIRATION RATE: 16 BRPM | HEIGHT: 62 IN | DIASTOLIC BLOOD PRESSURE: 77 MMHG | WEIGHT: 164 LBS | OXYGEN SATURATION: 98 % | SYSTOLIC BLOOD PRESSURE: 125 MMHG

## 2021-09-25 DIAGNOSIS — Z23 ENCOUNTER FOR IMMUNIZATION: ICD-10-CM

## 2021-09-25 DIAGNOSIS — M25.561 PAIN IN RIGHT KNEE: ICD-10-CM

## 2021-09-25 DIAGNOSIS — G89.29 PAIN IN RIGHT KNEE: ICD-10-CM

## 2021-09-25 DIAGNOSIS — M79.641 PAIN IN RIGHT HAND: ICD-10-CM

## 2021-09-25 PROCEDURE — G0008: CPT

## 2021-09-25 PROCEDURE — 99072 ADDL SUPL MATRL&STAF TM PHE: CPT

## 2021-09-25 PROCEDURE — 90686 IIV4 VACC NO PRSV 0.5 ML IM: CPT

## 2021-09-25 PROCEDURE — 99214 OFFICE O/P EST MOD 30 MIN: CPT | Mod: 25

## 2021-09-25 RX ORDER — AMLODIPINE BESYLATE 10 MG/1
10 TABLET ORAL
Qty: 30 | Refills: 0 | Status: DISCONTINUED | COMMUNITY
Start: 2021-06-11 | End: 2021-09-25

## 2021-09-25 RX ORDER — METFORMIN HYDROCHLORIDE 500 MG/1
500 TABLET, FILM COATED, EXTENDED RELEASE ORAL
Qty: 30 | Refills: 2 | Status: DISCONTINUED | COMMUNITY
Start: 2021-07-02 | End: 2021-09-25

## 2021-09-25 NOTE — HEALTH RISK ASSESSMENT
[1 or 2 (0 pts)] : 1 or 2 (0 points) [Never (0 pts)] : Never (0 points) [No] : In the past 12 months have you used drugs other than those required for medical reasons? No [1] : 1) Little interest or pleasure doing things for several days (1) [3] : 2) Feeling down, depressed, or hopeless for nearly every day (3) [Patient reported mammogram was normal] : Patient reported mammogram was normal [Patient reported PAP Smear was normal] : Patient reported PAP Smear was normal [Patient reported colonoscopy was abnormal] : Patient reported colonoscopy was abnormal [HIV test declined] : HIV test declined [Hepatitis C test offered] : Hepatitis C test offered [Employed] : employed [] :  [Feels Safe at Home] : Feels safe at home [] : No [[id number]] : 4 [Change in mental status noted] : No change in mental status noted [Language] : denies difficulty with language [Behavior] : denies difficulty with behavior [Learning/Retaining New Information] : denies difficulty learning/retaining new information [Handling Complex Tasks] : denies difficulty handling complex tasks [Reasoning] : denies difficulty with reasoning [Spatial Ability and Orientation] : denies difficulty with spatial ability and orientation [Sexually Active] : not sexually active [High Risk Behavior] : no high risk behavior [MammogramDate] : 07/2021 [PapSmearDate] : 07/2021 [ColonoscopyDate] : 01/2020 [ColonoscopyComments] : REPEAT IN 2025; [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

## 2021-09-25 NOTE — PHYSICAL EXAM
[No Acute Distress] : no acute distress [Well Nourished] : well nourished [Well Developed] : well developed [Well-Appearing] : well-appearing [Normal Sclera/Conjunctiva] : normal sclera/conjunctiva [PERRL] : pupils equal round and reactive to light [EOMI] : extraocular movements intact [Normal Outer Ear/Nose] : the outer ears and nose were normal in appearance [Normal Oropharynx] : the oropharynx was normal [No JVD] : no jugular venous distention [No Lymphadenopathy] : no lymphadenopathy [Supple] : supple [Thyroid Normal, No Nodules] : the thyroid was normal and there were no nodules present [No Respiratory Distress] : no respiratory distress  [No Accessory Muscle Use] : no accessory muscle use [Clear to Auscultation] : lungs were clear to auscultation bilaterally [Normal Rate] : normal rate  [Regular Rhythm] : with a regular rhythm [Normal S1, S2] : normal S1 and S2 [No Murmur] : no murmur heard [No Carotid Bruits] : no carotid bruits [No Abdominal Bruit] : a ~M bruit was not heard ~T in the abdomen [No Varicosities] : no varicosities [Pedal Pulses Present] : the pedal pulses are present [No Edema] : there was no peripheral edema [No Palpable Aorta] : no palpable aorta [No Extremity Clubbing/Cyanosis] : no extremity clubbing/cyanosis [Soft] : abdomen soft [Non Tender] : non-tender [Non-distended] : non-distended [No Masses] : no abdominal mass palpated [No HSM] : no HSM [Normal Bowel Sounds] : normal bowel sounds [Normal Posterior Cervical Nodes] : no posterior cervical lymphadenopathy [Normal Anterior Cervical Nodes] : no anterior cervical lymphadenopathy [No CVA Tenderness] : no CVA  tenderness [No Spinal Tenderness] : no spinal tenderness [No Joint Swelling] : no joint swelling [Grossly Normal Strength/Tone] : grossly normal strength/tone [No Rash] : no rash [Coordination Grossly Intact] : coordination grossly intact [No Focal Deficits] : no focal deficits [Normal Gait] : normal gait [Deep Tendon Reflexes (DTR)] : deep tendon reflexes were 2+ and symmetric [Normal Affect] : the affect was normal [Normal Insight/Judgement] : insight and judgment were intact [de-identified] : Right thumb stiffness.    5/5 of strength of b/l upper and lower extremities; sensation, reflexes and pulses of all 4 extremities are grossly intact. no knees or calfs or ankles swelling or redness; anterior/ posterior draw test,  Willem test, Apley compression test, patellar grind test all wnl.

## 2021-09-25 NOTE — ASSESSMENT
[FreeTextEntry1] : COVID19 SHOT X2 LAST SHOT IN 03/2021\par \par labs done in 07/2021 discussed with pt in details \par \par anxiety and depression: \par now since pt is start working and socializing; mood improved; \par continue observe without any further intervention \par pt will let me know if nay thing change \par \par \par HTN:\par on amlodipine and losartan\par well control now \par 3 months follow up \par \par \par DMII: well controlled \par saw dietitian\par continue with current meds \par healthy diet and exercise\par saw podiatry \par has not see ophthal advised to follow up \par \par HLD not well controlled'\par continue with statin\par labs \par healthy diet and exercise \par \par \par labs as ordered \par \par chest pain; had work up done by cardio with TTE and stress and EKG and cxr \par \par joints pain and swelling : \par \par  labs to r/o rheumatoid disease result wnl; \par \par \par thumb pain and stiffness: \par no red flag symptoms\par X ray HAND WNL; \par diclofenac cream as needed\par  ice and rest;\par REFER TO HAND SURGERY\par \par \par thyroid nodule;\par TFT wnl \par thyroid US showed benign nodule; will repeat in 1 yr( 09/2022)\par \par \par 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

## 2021-09-25 NOTE — HISTORY OF PRESENT ILLNESS
[de-identified] : 51 y.o chinese speaking F w/pmhX of prior CP, URINARY incontinent , HTN, HLD, DMII, chronic thumb pain, anxiety and depression  HERE TO follow up;\par \par PRIOR PCP WAS ( [phone]) in [place]; \par \par Chest pain saw cardio . Patient underwent an echocardiogram and it showed normal LV function without significant valvular pathology. Patient underwent a treadmill stress test and completed 9.5 minutes of Brennon protocol. There were upsloping ST depressions on ECG but no symptoms. Patient was felt to be at low risk for having significant CAD and CXR negative;\par last TTE in 06/2021 wnl \par \par dmii: LAST TIME SAW Ophthal WAS IN 03/2021; normal per pt; saw dietitian already has not see podiatry \par \par HTN: home BP log with BP 110s-130s/ 80s ; denies of any symptoms; again did not take home BP meds; denies of any symptoms \par \par HLD: was on fish oil \par \par B/L HAND JOINTS PAIN AND THUMB PAIN: for 1 -2 months; was seeing Dr.Wensong Dutta PMR( [phone]) WAS on PHYSICAL Therapy; AND ON Gabapentin; PT WAS NOT ABLE TO TOLERATE WITH Gabapentin; SHE had SOB AND cp WHEN SHE WAS ON Gabapentin; NO TRaUMA OR INJURY ;NO Weakness OR Numbness; x RAY HAND WNL; LABS DOSE NOT Indicate ANY RHEUMATOID DISEASE;\par \par HAD X RAY LUMBAR AND CERVICAL DONE BY PMR IN 06/2021 WITH GROSSLY NEGATIVE RESULTS Besides SOME MILD DGENERATIVE CHANGE OF Lumbar DISC \par \par WAS ON STRESS DUE TO PT'S STEP Parent  WAS SICK IN FAMILY\par \par anxiety and DEPRESSION : GAD7 =17  phq 7 =11 no jonathan/ no hallucination/ No delusions /no harmful ideation/ HAS Suicidal IDEATION ONCE BUT NO SPECIFIC PLAN \par \par thyroid nodule: has not do thyroid US; \par \par saw UROGYN on meds for stress incontinent; \par \par STILL HAS PAIN OF HANDS Especially OF RIGHT THUMB; \par saw PMR s/p physical therapy and injection with mild relieve\par \par chronic intermittent right knee pain; no recent injury or trauma or swelling or weakness or numbness or fever or chills \par

## 2021-10-08 ENCOUNTER — APPOINTMENT (OUTPATIENT)
Dept: INTERNAL MEDICINE | Facility: CLINIC | Age: 51
End: 2021-10-08
Payer: MEDICAID

## 2021-10-08 PROCEDURE — 99442: CPT

## 2021-10-08 NOTE — HISTORY OF PRESENT ILLNESS
[de-identified] : This visit was provided via TELEPHONE. The patient, URBANO CASTELLANO , was located at home,[address] [place] [address] [address]\[place] [address] , at the time of the visit. \par The provider,GERRI LIU , was located at his medical office located in  at the time of the visit. The patient, and Provider participated in the TELEPHONE\par Verbal consent given on Oct  8 2021  4:00PM by the patient.\par \par \par 51 y.o chinese speaking F w/pmhX of prior CP, URINARY incontinent , HTN, HLD, DMII, chronic thumb pain, anxiety and depression  HERE TO follow up;\par \par PRIOR PCP WAS ( [phone]) in [place]; \par \par Chest pain saw cardio . Patient underwent an echocardiogram and it showed normal LV function without significant valvular pathology. Patient underwent a treadmill stress test and completed 9.5 minutes of Brennon protocol. There were upsloping ST depressions on ECG but no symptoms. Patient was felt to be at low risk for having significant CAD and CXR negative;\par last TTE in 06/2021 wnl \par \par dmii: LAST TIME SAW Ophthal WAS IN 03/2021; normal per pt; saw dietitian already has not see podiatry \par \par HTN: home BP log with BP 110s-130s/ 80s ; denies of any symptoms; again did not take home BP meds; denies of any symptoms \par \par HLD: was on fish oil \par \par B/L HAND JOINTS PAIN AND THUMB PAIN: for 1 -2 months; was seeing Dr.Wensong Dutta PMR( [phone]) WAS on PHYSICAL Therapy; AND ON Gabapentin; PT WAS NOT ABLE TO TOLERATE WITH Gabapentin; SHE had SOB AND cp WHEN SHE WAS ON Gabapentin; NO TRaUMA OR INJURY ;NO Weakness OR Numbness; x RAY HAND WNL; LABS DOSE NOT Indicate ANY RHEUMATOID DISEASE;\par \par HAD X RAY LUMBAR AND CERVICAL DONE BY PMR IN 06/2021 WITH GROSSLY NEGATIVE RESULTS Besides SOME MILD DGENERATIVE CHANGE OF Lumbar DISC \par \par WAS ON STRESS DUE TO PT'S STEP Parent  WAS SICK IN FAMILY\par \par anxiety and DEPRESSION : GAD7 =17  phq 7 =11 no jonathan/ no hallucination/ No delusions /no harmful ideation/ HAS Suicidal IDEATION ONCE BUT NO SPECIFIC PLAN \par \par thyroid nodule: has not do thyroid US; \par \par saw UROGYN on meds for stress incontinent; \par \par STILL HAS PAIN OF HANDS Especially OF RIGHT THUMB; \par saw PMR s/p physical therapy and injection with mild relieve\par \par chronic intermittent right knee pain; no recent injury or trauma or swelling or weakness or numbness or fever or chills \par

## 2021-10-08 NOTE — ASSESSMENT
[FreeTextEntry1] : COVID19 SHOT X2 LAST SHOT IN 03/2021\par \par labs done in 07/2021 discussed with pt in details \par \par anxiety and depression: \par now since pt is start working and socializing; mood improved; \par continue observe without any further intervention \par pt will let me know if nay thing change \par \par \par HTN:\par on amlodipine and losartan\par well control now \par 3 months follow up \par \par \par DMII: well controlled \par saw dietitian\par continue with current meds \par healthy diet and exercise\par saw podiatry \par has not see ophthal advised to follow up \par \par HLD \par very well controlled \par continue with statin\par healthy diet and exercise \par \par \par labs as ordered \par \par chest pain; had work up done by cardio with TTE and stress and EKG and cxr \par \par joints pain and swelling : \par \par  labs to r/o rheumatoid disease result wnl; \par \par \par thumb pain and stiffness: \par no red flag symptoms\par X ray HAND WNL; \par diclofenac cream as needed\par  ice and rest;\par REFER TO HAND SURGERY\par \par \par thyroid nodule;\par TFT wnl \par thyroid US showed benign nodule; will repeat in 1 yr( 09/2022)\par \par \par 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

## 2021-10-08 NOTE — HEALTH RISK ASSESSMENT
[] : No [1 or 2 (0 pts)] : 1 or 2 (0 points) [Never (0 pts)] : Never (0 points) [No] : In the past 12 months have you used drugs other than those required for medical reasons? No [1] : 1) Little interest or pleasure doing things for several days (1) [3] : 2) Feeling down, depressed, or hopeless for nearly every day (3) [[id number]] : 4 [Patient reported mammogram was normal] : Patient reported mammogram was normal [Patient reported PAP Smear was normal] : Patient reported PAP Smear was normal [Patient reported colonoscopy was abnormal] : Patient reported colonoscopy was abnormal [HIV test declined] : HIV test declined [Hepatitis C test offered] : Hepatitis C test offered [Change in mental status noted] : No change in mental status noted [Language] : denies difficulty with language [Behavior] : denies difficulty with behavior [Learning/Retaining New Information] : denies difficulty learning/retaining new information [Handling Complex Tasks] : denies difficulty handling complex tasks [Reasoning] : denies difficulty with reasoning [Spatial Ability and Orientation] : denies difficulty with spatial ability and orientation [Employed] : employed [] :  [Sexually Active] : not sexually active [High Risk Behavior] : no high risk behavior [Feels Safe at Home] : Feels safe at home [MammogramDate] : 07/2021 [PapSmearDate] : 07/2021 [ColonoscopyDate] : 01/2020 [ColonoscopyComments] : REPEAT IN 2025; [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

## 2021-12-29 ENCOUNTER — APPOINTMENT (OUTPATIENT)
Dept: INTERNAL MEDICINE | Facility: CLINIC | Age: 51
End: 2021-12-29
Payer: MEDICAID

## 2021-12-29 VITALS
OXYGEN SATURATION: 97 % | WEIGHT: 166 LBS | HEIGHT: 62 IN | HEART RATE: 78 BPM | DIASTOLIC BLOOD PRESSURE: 77 MMHG | TEMPERATURE: 97.2 F | BODY MASS INDEX: 30.55 KG/M2 | SYSTOLIC BLOOD PRESSURE: 133 MMHG | RESPIRATION RATE: 16 BRPM

## 2021-12-29 DIAGNOSIS — F32.A DEPRESSION, UNSPECIFIED: ICD-10-CM

## 2021-12-29 DIAGNOSIS — E04.1 NONTOXIC SINGLE THYROID NODULE: ICD-10-CM

## 2021-12-29 DIAGNOSIS — Z11.59 ENCOUNTER FOR SCREENING FOR OTHER VIRAL DISEASES: ICD-10-CM

## 2021-12-29 LAB
ALBUMIN SERPL ELPH-MCNC: 4.9 G/DL
ALP BLD-CCNC: 84 U/L
ALT SERPL-CCNC: 45 U/L
ANION GAP SERPL CALC-SCNC: 12 MMOL/L
AST SERPL-CCNC: 31 U/L
BILIRUB SERPL-MCNC: 0.7 MG/DL
BUN SERPL-MCNC: 11 MG/DL
CALCIUM SERPL-MCNC: 10 MG/DL
CHLORIDE SERPL-SCNC: 103 MMOL/L
CHOLEST SERPL-MCNC: 153 MG/DL
CO2 SERPL-SCNC: 26 MMOL/L
CREAT SERPL-MCNC: 0.61 MG/DL
ESTIMATED AVERAGE GLUCOSE: 126 MG/DL
GLUCOSE SERPL-MCNC: 116 MG/DL
HBA1C MFR BLD HPLC: 6 %
HDLC SERPL-MCNC: 56 MG/DL
LDLC SERPL CALC-MCNC: 69 MG/DL
NONHDLC SERPL-MCNC: 98 MG/DL
POTASSIUM SERPL-SCNC: 4.9 MMOL/L
PROT SERPL-MCNC: 7.3 G/DL
SODIUM SERPL-SCNC: 140 MMOL/L
TRIGL SERPL-MCNC: 144 MG/DL

## 2021-12-29 PROCEDURE — 99072 ADDL SUPL MATRL&STAF TM PHE: CPT

## 2021-12-29 PROCEDURE — 99214 OFFICE O/P EST MOD 30 MIN: CPT

## 2021-12-29 RX ORDER — BLOOD-GLUCOSE METER
EACH MISCELLANEOUS
Qty: 1 | Refills: 2 | Status: ACTIVE | COMMUNITY
Start: 2021-12-29 | End: 1900-01-01

## 2021-12-29 NOTE — HISTORY OF PRESENT ILLNESS
[de-identified] : 51 y.o chinese speaking F w/pmhX of prior CP, URINARY incontinent , HTN, HLD, DMII, chronic thumb pain, anxiety and depression  HERE TO follow up;\par \par PRIOR PCP WAS ( [phone]) in [place]; \par \par Chest pain saw cardio . Patient underwent an echocardiogram and it showed normal LV function without significant valvular pathology. Patient underwent a treadmill stress test and completed 9.5 minutes of Brennon protocol. There were upsloping ST depressions on ECG but no symptoms. Patient was felt to be at low risk for having significant CAD and CXR negative;\par last TTE in 06/2021 wnl \par \par dmii: LAST TIME SAW Ophthal WAS IN 03/2021; normal per pt; saw dietitian already has not see podiatry \par \par HTN: home BP log with BP 110s-130s/ 80s ; denies of any symptoms; again did not take home BP meds; denies of any symptoms \par \par HLD: was on fish oil \par \par B/L HAND JOINTS PAIN AND THUMB PAIN: for 1 -2 months; was seeing Dr.Wensong Dutta PMR( [phone]) WAS on PHYSICAL Therapy; AND ON Gabapentin; PT WAS NOT ABLE TO TOLERATE WITH Gabapentin; SHE had SOB AND cp WHEN SHE WAS ON Gabapentin; NO TRaUMA OR INJURY ;NO Weakness OR Numbness; x RAY HAND WNL; LABS DOSE NOT Indicate ANY RHEUMATOID DISEASE;\par \par HAD X RAY LUMBAR AND CERVICAL DONE BY PMR IN 06/2021 WITH GROSSLY NEGATIVE RESULTS Besides SOME MILD DGENERATIVE CHANGE OF Lumbar DISC \par \par WAS ON STRESS DUE TO PT'S STEP Parent  WAS SICK IN FAMILY\par \par anxiety and DEPRESSION : GAD7 =17  phq 7 =11 no jonathan/ no hallucination/ No delusions /no harmful ideation/ HAS Suicidal IDEATION ONCE BUT NO SPECIFIC PLAN \par \par thyroid nodule: has not do thyroid US; \par \par saw UROGYN on meds for stress incontinent; \par \par STILL HAS PAIN OF HANDS Especially OF RIGHT THUMB; \par saw PMR s/p physical therapy and injection with mild relieve\par pt now reported that she had numbness at night and pain; \par saw ([phone]) pmr AND HAD NERVE CONDUCTION TEST DONE WITH SOME IMPINGEMENT WITH lT CERVICAL AREA; \par \par chronic intermittent right knee pain; no recent injury or trauma or swelling or weakness or numbness or fever or chills \par  \par \par ROS\par \par Constitutional:  no fever and no chills. \par Eyes:  no discharge and no pain. \par HEENT:  no earache and no hearing loss. \par Cardiovascular:  no chest pain, no palpitations and no leg claudication. \par Respiratory:  no shortness of breath, no wheezing and no cough. \par Gastrointestinal:  no abdominal pain, no nausea and no constipation. \par Genitourinary:  no dysuria and no incontinence. \par Musculoskeletal:  no joint pain, no joint stiffness and no joint swelling. \par Integumentary:  no itching and no mole changes. \par Neurological:  no headache, no dizziness and no fainting. \par Psychiatric:  not suicidal, no insomnia, no anxiety and no depression. \par \par Physical Exam\par \par Constitutional:   no acute distress, well nourished, well developed and well-appearing. \par Eyes:  normal sclera/conjunctiva, pupils equal round and reactive to light and extraocular movements intact. \par ENT:  the outer ears and nose were normal in appearance and the oropharynx was normal. \par Neck:  supple, no lymphadenopathy and the thyroid was normal and there were no nodules present. \par Pulmonary:  no respiratory distress, lungs were clear to auscultation bilaterally, no accessory muscle use. \par Cardiac:  normal rate, with a regular rhythm, normal S1 and S2 and no murmur heard. \par Vascular:  there was no peripheral edema. \par Abdomen:  abdomen soft, non-tender, non-distended, no abdominal mass palpated, no HSM and normal bowel sounds. \par Lymphatic:  no posterior cervical lymphadenopathy, no anterior cervical lymphadenopathy. \par Back:  no CVA tenderness and no spinal tenderness. \par Musculoskeletal: no joint swelling and grossly normal strength/tone. \par Skin:  no rash. \par Neurology:  normal gait, coordination grossly intact, no focal deficits and deep tendon reflexes were 2+ and symmetric. \par Psychiatric:  the affect was normal, oriented to person, place, and time and insight and judgment were intact.\par

## 2021-12-29 NOTE — HEALTH RISK ASSESSMENT
[1 or 2 (0 pts)] : 1 or 2 (0 points) [Never (0 pts)] : Never (0 points) [No] : In the past 12 months have you used drugs other than those required for medical reasons? No [1] : 1) Little interest or pleasure doing things for several days (1) [3] : 2) Feeling down, depressed, or hopeless for nearly every day (3) [Patient reported mammogram was normal] : Patient reported mammogram was normal [Patient reported PAP Smear was normal] : Patient reported PAP Smear was normal [Patient reported colonoscopy was abnormal] : Patient reported colonoscopy was abnormal [HIV test declined] : HIV test declined [Hepatitis C test offered] : Hepatitis C test offered [Employed] : employed [] :  [Feels Safe at Home] : Feels safe at home [[id number]] : 4 [Change in mental status noted] : No change in mental status noted [Language] : denies difficulty with language [Behavior] : denies difficulty with behavior [Learning/Retaining New Information] : denies difficulty learning/retaining new information [Handling Complex Tasks] : denies difficulty handling complex tasks [Reasoning] : denies difficulty with reasoning [Spatial Ability and Orientation] : denies difficulty with spatial ability and orientation [Sexually Active] : not sexually active [High Risk Behavior] : no high risk behavior [MammogramDate] : 07/2021 [PapSmearDate] : 07/2021 [ColonoscopyDate] : 01/2020 [ColonoscopyComments] : REPEAT IN 2025; [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

## 2021-12-29 NOTE — ASSESSMENT
[FreeTextEntry1] : COVID19 SHOT X2 LAST SHOT IN 03/2021\par \par labs done in 07/2021 discussed with pt in details \par \par anxiety and depression: \par now since pt is start working and socializing; mood improved; \par continue observe without any further intervention \par pt will let me know if nay thing change \par \par \par HTN:\par on amlodipine and losartan\par well control now \par 3 months follow up \par \par \par DMII: well controlled \par saw dietitian\par continue with current meds \par healthy diet and exercise\par saw podiatry \par has not see ophthal advised to follow up \par \par HLD \par very well controlled \par continue with statin\par healthy diet and exercise \par \par \par anxiety and depression and fibromyalgia\par pt saw hand surgery , pmr; had x ray of cervical and NCT test done\par I believe pt's somatic symptoms of hands has psych factor especailly she has a lot stress at home \par I advised \par  start trail Cymbalta which might have benefit with pt's mood and pain \par -pt denies of current pregnancy or breast feeding\par -discussed with pt in details that the possible side effect  and adverse reactions of the psych medication; pt aware that the medication might increase suicidal ideation at early phase; pt also agree to call 911 immediately if she experience suicidal or harmful thought; pt also understand that if she want to stop this medication, she need to call the clinic back for further instruction\par -follow up in 1 month\par \par chest pain; had work up done by cardio with TTE and stress and EKG and cxr \par \par joints pain and swelling : \par \par  labs to r/o rheumatoid disease result wnl; \par \par \par thumb pain and stiffness: \par no red flag symptoms\par X ray HAND WNL; \par diclofenac cream as needed\par  ice and rest;\par saw HAND SURGERY pt stated no further work up \par \par \par thyroid nodule;\par TFT wnl \par thyroid US showed benign nodule; will repeat in 1 yr( 09/2022)\par \par \par 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

## 2022-01-03 ENCOUNTER — NON-APPOINTMENT (OUTPATIENT)
Age: 52
End: 2022-01-03

## 2022-01-03 LAB
ALBUMIN SERPL ELPH-MCNC: 4.8 G/DL
ALP BLD-CCNC: 78 U/L
ALT SERPL-CCNC: 34 U/L
ANION GAP SERPL CALC-SCNC: 12 MMOL/L
AST SERPL-CCNC: 22 U/L
BILIRUB SERPL-MCNC: 0.5 MG/DL
BUN SERPL-MCNC: 14 MG/DL
CALCIUM SERPL-MCNC: 9.6 MG/DL
CHLORIDE SERPL-SCNC: 104 MMOL/L
CHOLEST SERPL-MCNC: 158 MG/DL
CO2 SERPL-SCNC: 25 MMOL/L
CREAT SERPL-MCNC: 0.64 MG/DL
ESTIMATED AVERAGE GLUCOSE: 146 MG/DL
GLUCOSE SERPL-MCNC: 121 MG/DL
HBA1C MFR BLD HPLC: 6.7 %
HDLC SERPL-MCNC: 52 MG/DL
LDLC SERPL CALC-MCNC: 74 MG/DL
NONHDLC SERPL-MCNC: 107 MG/DL
POTASSIUM SERPL-SCNC: 4.7 MMOL/L
PROT SERPL-MCNC: 7.1 G/DL
SODIUM SERPL-SCNC: 142 MMOL/L
TRIGL SERPL-MCNC: 165 MG/DL

## 2022-01-05 LAB — SARS-COV-2 N GENE NPH QL NAA+PROBE: NOT DETECTED

## 2022-02-05 ENCOUNTER — APPOINTMENT (OUTPATIENT)
Dept: INTERNAL MEDICINE | Facility: CLINIC | Age: 52
End: 2022-02-05
Payer: MEDICAID

## 2022-02-05 VITALS
WEIGHT: 167 LBS | HEIGHT: 62 IN | OXYGEN SATURATION: 97 % | HEART RATE: 81 BPM | RESPIRATION RATE: 16 BRPM | BODY MASS INDEX: 30.73 KG/M2 | DIASTOLIC BLOOD PRESSURE: 87 MMHG | TEMPERATURE: 97.3 F | SYSTOLIC BLOOD PRESSURE: 135 MMHG

## 2022-02-05 PROCEDURE — 99072 ADDL SUPL MATRL&STAF TM PHE: CPT

## 2022-02-05 PROCEDURE — 99213 OFFICE O/P EST LOW 20 MIN: CPT

## 2022-02-05 NOTE — HEALTH RISK ASSESSMENT
[1 or 2 (0 pts)] : 1 or 2 (0 points) [Never (0 pts)] : Never (0 points) [No] : In the past 12 months have you used drugs other than those required for medical reasons? No [1] : 1) Little interest or pleasure doing things for several days (1) [3] : 2) Feeling down, depressed, or hopeless for nearly every day (3) [Patient reported mammogram was normal] : Patient reported mammogram was normal [Patient reported PAP Smear was normal] : Patient reported PAP Smear was normal [Patient reported colonoscopy was abnormal] : Patient reported colonoscopy was abnormal [HIV test declined] : HIV test declined [Hepatitis C test offered] : Hepatitis C test offered [Employed] : employed [] :  [Feels Safe at Home] : Feels safe at home [[id number]] : 4 [Change in mental status noted] : No change in mental status noted [Language] : denies difficulty with language [Behavior] : denies difficulty with behavior [Learning/Retaining New Information] : denies difficulty learning/retaining new information [Handling Complex Tasks] : denies difficulty handling complex tasks [Reasoning] : denies difficulty with reasoning [Spatial Ability and Orientation] : denies difficulty with spatial ability and orientation [Sexually Active] : not sexually active [High Risk Behavior] : no high risk behavior [MammogramDate] : 07/2021 [PapSmearDate] : 07/2021 [ColonoscopyDate] : 01/2020 [ColonoscopyComments] : REPEAT IN 2025; [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

## 2022-02-05 NOTE — ASSESSMENT
[FreeTextEntry1] : COVID19 SHOT X2 LAST SHOT IN 03/2021\par \par labs done in 07/2021 discussed with pt in details \par \par anxiety and depression: \par now since pt is start working and socializing; mood improved; \par continue observe without any further intervention \par pt will let me know if nay thing change \par \par \par HTN:\par on amlodipine and losartan\par well control now \par 3 months follow up \par \par \par DMII: well controlled \par saw dietitian\par continue with current meds \par healthy diet and exercise\par saw podiatry \par has not see ophthal advised to follow up \par \par HLD \par very well controlled \par continue with statin\par healthy diet and exercise \par \par \par anxiety and depression and fibromyalgia\par pt saw hand surgery , pmr; had x ray of cervical and NCT test done\par I believe pt's somatic symptoms of hands has psych factor especailly she has a lot stress at home \par doing well and continue with Cymbalta \par -pt denies of current pregnancy or breast feeding\par -discussed with pt in details that the possible side effect  and adverse reactions of the psych medication; pt aware that the medication might increase suicidal ideation at early phase; pt also agree to call 911 immediately if she experience suicidal or harmful thought; pt also understand that if she want to stop this medication, she need to call the clinic back for further instruction\par -follow up in 2 month\par \par chest pain; had work up done by cardio with TTE and stress and EKG and cxr \par \par joints pain and swelling : \par \par  labs to r/o rheumatoid disease result wnl; \par \par \par thumb pain and stiffness: \par no red flag symptoms\par X ray HAND WNL; \par diclofenac cream as needed\par  ice and rest;\par saw HAND SURGERY pt stated no further work up \par \par \par thyroid nodule;\par TFT wnl \par thyroid US showed benign nodule; will repeat in 1 yr( 09/2022)\par \par \par HX OF H.PYLORI  TREATED BY gi IN 10/2020; confirmed eradication\par \par \par 2 month follow up \par \par \par \par

## 2022-02-05 NOTE — HISTORY OF PRESENT ILLNESS
[de-identified] : 51 y.o chinese speaking F w/pmhX of prior CP, URINARY incontinent , HTN, HLD, DMII, chronic thumb pain, anxiety and depression  HERE TO follow up;\par \par PRIOR PCP WAS ( [phone]) in [place]; \par \par Chest pain saw cardio . Patient underwent an echocardiogram and it showed normal LV function without significant valvular pathology. Patient underwent a treadmill stress test and completed 9.5 minutes of Brennon protocol. There were upsloping ST depressions on ECG but no symptoms. Patient was felt to be at low risk for having significant CAD and CXR negative;\par last TTE in 06/2021 wnl \par \par dmii: LAST TIME SAW Ophthal WAS IN 03/2021; normal per pt; saw dietitian already has not see podiatry \par \par HTN: home BP log with BP 110s-130s/ 80s ; denies of any symptoms; again did not take home BP meds; denies of any symptoms \par \par HLD: was on fish oil \par \par B/L HAND JOINTS PAIN AND THUMB PAIN: for 1 -2 months; was seeing Dr.Wensong Dutta PMR( [phone]) WAS on PHYSICAL Therapy; AND ON Gabapentin; PT WAS NOT ABLE TO TOLERATE WITH Gabapentin; SHE had SOB AND cp WHEN SHE WAS ON Gabapentin; NO TRaUMA OR INJURY ;NO Weakness OR Numbness; x RAY HAND WNL; LABS DOSE NOT Indicate ANY RHEUMATOID DISEASE;\par \par HAD X RAY LUMBAR AND CERVICAL DONE BY PMR IN 06/2021 WITH GROSSLY NEGATIVE RESULTS Besides SOME MILD DGENERATIVE CHANGE OF Lumbar DISC \par \par WAS ON STRESS DUE TO PT'S STEP Parent  WAS SICK IN FAMILY\par \par anxiety and DEPRESSION : GAD7 =17  phq 7 =11 no jonathan/ no hallucination/ No delusions /no harmful ideation/ HAS Suicidal IDEATION ONCE BUT NO SPECIFIC PLAN \par \par thyroid nodule: has not do thyroid US; \par \par saw UROGYN on meds for stress incontinent; \par \par STILL HAS PAIN OF HANDS Especially OF RIGHT THUMB; \par saw PMR s/p physical therapy and injection with mild relieve\par pt now reported that she had numbness at night and pain; \par saw ([phone]) pmr AND HAD NERVE CONDUCTION TEST DONE WITH SOME IMPINGEMENT WITH lT CERVICAL AREA; \par \par chronic intermittent right knee pain; no recent injury or trauma or swelling or weakness or numbness or fever or chills \par \par now since on Cymbalta; pain improved; tolerate with meds \par  \par  \par \par ROS\par \par Constitutional:  no fever and no chills. \par Eyes:  no discharge and no pain. \par HEENT:  no earache and no hearing loss. \par Cardiovascular:  no chest pain, no palpitations and no leg claudication. \par Respiratory:  no shortness of breath, no wheezing and no cough. \par Gastrointestinal:  no abdominal pain, no nausea and no constipation. \par Genitourinary:  no dysuria and no incontinence. \par Musculoskeletal:  no joint pain, no joint stiffness and no joint swelling. \par Integumentary:  no itching and no mole changes. \par Neurological:  no headache, no dizziness and no fainting. \par Psychiatric:  not suicidal, no insomnia, no anxiety and no depression. \par \par Physical Exam\par \par Constitutional:   no acute distress, well nourished, well developed and well-appearing. \par Eyes:  normal sclera/conjunctiva, pupils equal round and reactive to light and extraocular movements intact. \par ENT:  the outer ears and nose were normal in appearance and the oropharynx was normal. \par Neck:  supple, no lymphadenopathy and the thyroid was normal and there were no nodules present. \par Pulmonary:  no respiratory distress, lungs were clear to auscultation bilaterally, no accessory muscle use. \par Cardiac:  normal rate, with a regular rhythm, normal S1 and S2 and no murmur heard. \par Vascular:  there was no peripheral edema. \par Abdomen:  abdomen soft, non-tender, non-distended, no abdominal mass palpated, no HSM and normal bowel sounds. \par Lymphatic:  no posterior cervical lymphadenopathy, no anterior cervical lymphadenopathy. \par Back:  no CVA tenderness and no spinal tenderness. \par Musculoskeletal: no joint swelling and grossly normal strength/tone. \par Skin:  no rash. \par Neurology:  normal gait, coordination grossly intact, no focal deficits and deep tendon reflexes were 2+ and symmetric. \par Psychiatric:  the affect was normal, oriented to person, place, and time and insight and judgment were intact.\par

## 2022-03-20 ENCOUNTER — EMERGENCY (EMERGENCY)
Facility: HOSPITAL | Age: 52
LOS: 1 days | Discharge: HOME | End: 2022-03-20
Attending: EMERGENCY MEDICINE
Payer: COMMERCIAL

## 2022-03-20 VITALS
RESPIRATION RATE: 18 BRPM | OXYGEN SATURATION: 100 % | SYSTOLIC BLOOD PRESSURE: 147 MMHG | HEART RATE: 104 BPM | DIASTOLIC BLOOD PRESSURE: 80 MMHG | WEIGHT: 166.89 LBS | HEIGHT: 63 IN | TEMPERATURE: 98 F

## 2022-03-20 VITALS — HEART RATE: 80 BPM

## 2022-03-20 PROCEDURE — 99283 EMERGENCY DEPT VISIT LOW MDM: CPT | Mod: 25

## 2022-03-20 PROCEDURE — 99282 EMERGENCY DEPT VISIT SF MDM: CPT | Mod: 25

## 2022-03-20 PROCEDURE — 90715 TDAP VACCINE 7 YRS/> IM: CPT

## 2022-03-20 PROCEDURE — 12031 INTMD RPR S/A/T/EXT 2.5 CM/<: CPT

## 2022-03-20 PROCEDURE — 90471 IMMUNIZATION ADMIN: CPT

## 2022-03-20 PROCEDURE — 12041 INTMD RPR N-HF/GENIT 2.5CM/<: CPT

## 2022-03-20 RX ORDER — TETANUS TOXOID, REDUCED DIPHTHERIA TOXOID AND ACELLULAR PERTUSSIS VACCINE, ADSORBED 5; 2.5; 8; 8; 2.5 [IU]/.5ML; [IU]/.5ML; UG/.5ML; UG/.5ML; UG/.5ML
0.5 SUSPENSION INTRAMUSCULAR ONCE
Refills: 0 | Status: COMPLETED | OUTPATIENT
Start: 2022-03-20 | End: 2022-03-20

## 2022-03-20 RX ADMIN — TETANUS TOXOID, REDUCED DIPHTHERIA TOXOID AND ACELLULAR PERTUSSIS VACCINE, ADSORBED 0.5 MILLILITER(S): 5; 2.5; 8; 8; 2.5 SUSPENSION INTRAMUSCULAR at 18:51

## 2022-03-20 NOTE — ED ADULT NURSE NOTE - NSIMPLEMENTINTERV_GEN_ALL_ED
Implemented All Universal Safety Interventions:  [place] to call system. Call bell, personal items and telephone within reach. Instruct patient to call for assistance. Room bathroom lighting operational. Non-slip footwear when patient is off stretcher. Physically safe environment: no spills, clutter or unnecessary equipment. Stretcher in lowest position, wheels locked, appropriate side rails in place.

## 2022-03-20 NOTE — ED PROVIDER NOTE - CLINICAL SUMMARY MEDICAL DECISION MAKING FREE TEXT BOX
51 y.o female presents with avulsion to the right 3rd finger. Will repair wound. No foreign body noted.

## 2022-03-20 NOTE — ED PROVIDER NOTE - PATIENT PORTAL LINK FT
You can access the FollowMyHealth Patient Portal offered by [facility] by registering at the following website: [email or web address]. By joining [employer]’s FollowMyHealth portal, you will also be able to view your health information using other applications (apps) compatible with our system.

## 2022-03-20 NOTE — ED PROVIDER NOTE - PHYSICAL EXAMINATION
There is a 2cm laceration over the MCP joint with exposure of veins. Under full range of motion, the tendon is not exposed. Good strength to extremities. There is a 2cm laceration over the MCP joint with exposure of veins. Under full range of motion, the tendon is not exposed. Good strength to extention

## 2022-03-20 NOTE — ED PROVIDER NOTE - NSFOLLOWUPINSTRUCTIONS_ED_ALL_ED_FT
IMPORTANT INSTRUCTIONS FROM Dr. DOTSON:    Please follow up with your personal medical doctor in 24-48 hours.   Bring results from today to your visit.    If you were advised to take any medications - be sure to review the package insert.    If your symptoms change, get worse or if you have any new symptoms, come to the ER right away.  If you have any questions, call the ER at the phone number on this page. IMPORTANT INSTRUCTIONS FROM Dr. DOTSNO:    Return to the ER in 10 days for the removal of stiches.    Please follow up with your personal medical doctor in 24-48 hours.   Bring results from today to your visit.    If you were advised to take any medications - be sure to review the package insert.    If your symptoms change, get worse or if you have any new symptoms, come to the ER right away.  If you have any questions, call the ER at the phone number on this page.

## 2022-03-20 NOTE — ED PROVIDER NOTE - OBJECTIVE STATEMENT
51 y.o female presents with avulsion to the right 3rd finger. Patient states she had some bug bites and believed it was due to her bed. While scrubbing her bed she injured her finger, sustained a 2cm avulsion to the right ring finger. Patient did not wash her finger at the time.

## 2022-03-26 ENCOUNTER — APPOINTMENT (OUTPATIENT)
Dept: INTERNAL MEDICINE | Facility: CLINIC | Age: 52
End: 2022-03-26
Payer: MEDICAID

## 2022-03-26 VITALS
HEIGHT: 62 IN | BODY MASS INDEX: 30.55 KG/M2 | RESPIRATION RATE: 16 BRPM | HEART RATE: 98 BPM | TEMPERATURE: 97.4 F | OXYGEN SATURATION: 96 % | WEIGHT: 166 LBS | DIASTOLIC BLOOD PRESSURE: 78 MMHG | SYSTOLIC BLOOD PRESSURE: 133 MMHG

## 2022-03-26 DIAGNOSIS — F41.9 ANXIETY DISORDER, UNSPECIFIED: ICD-10-CM

## 2022-03-26 DIAGNOSIS — E55.9 VITAMIN D DEFICIENCY, UNSPECIFIED: ICD-10-CM

## 2022-03-26 DIAGNOSIS — E78.5 HYPERLIPIDEMIA, UNSPECIFIED: ICD-10-CM

## 2022-03-26 PROCEDURE — 99214 OFFICE O/P EST MOD 30 MIN: CPT

## 2022-03-26 RX ORDER — BLOOD SUGAR DIAGNOSTIC
STRIP MISCELLANEOUS
Qty: 270 | Refills: 1 | Status: ACTIVE | COMMUNITY
Start: 2021-06-29 | End: 1900-01-01

## 2022-03-26 RX ORDER — ISOPROPYL ALCOHOL 70 ML/100ML
SWAB TOPICAL
Qty: 1 | Refills: 3 | Status: ACTIVE | COMMUNITY
Start: 2021-12-29 | End: 1900-01-01

## 2022-03-26 RX ORDER — OMEGA-3/DHA/EPA/FISH OIL 300-1000MG
1000 CAPSULE ORAL
Qty: 360 | Refills: 1 | Status: ACTIVE | COMMUNITY
Start: 2021-07-10 | End: 1900-01-01

## 2022-03-26 RX ORDER — ALCOHOL ANTISEPTIC PADS
PADS, MEDICATED (EA) TOPICAL
Qty: 90 | Refills: 2 | Status: ACTIVE | COMMUNITY
Start: 2021-12-29 | End: 1900-01-01

## 2022-03-26 RX ORDER — MULTIVITAMIN
TABLET ORAL
Qty: 90 | Refills: 2 | Status: ACTIVE | COMMUNITY
Start: 2021-12-29 | End: 1900-01-01

## 2022-03-27 ENCOUNTER — EMERGENCY (EMERGENCY)
Facility: HOSPITAL | Age: 52
LOS: 1 days | Discharge: HOME | End: 2022-03-27
Attending: EMERGENCY MEDICINE
Payer: COMMERCIAL

## 2022-03-27 VITALS
WEIGHT: 166.01 LBS | SYSTOLIC BLOOD PRESSURE: 121 MMHG | OXYGEN SATURATION: 100 % | RESPIRATION RATE: 16 BRPM | HEART RATE: 96 BPM | HEIGHT: 63 IN | TEMPERATURE: 99 F | DIASTOLIC BLOOD PRESSURE: 86 MMHG

## 2022-03-27 PROCEDURE — G0463: CPT

## 2022-03-27 PROCEDURE — 99283 EMERGENCY DEPT VISIT LOW MDM: CPT

## 2022-03-27 RX ORDER — CEPHALEXIN 500 MG
1 CAPSULE ORAL
Qty: 28 | Refills: 0
Start: 2022-03-27 | End: 2022-04-02

## 2022-03-27 RX ORDER — MUPIROCIN 20 MG/G
1 OINTMENT TOPICAL
Qty: 1 | Refills: 0
Start: 2022-03-27 | End: 2022-03-31

## 2022-03-27 NOTE — ED PROVIDER NOTE - PHYSICAL EXAMINATION
GEN:   comfortable, in no apparent distress, AOx3  EYES:   PERRL, extra-occular movements intact  RESP:   non-labored, speaking in full sentences  ABD:   soft, non tender, no guarding  :   no cva tenderness  MSK:   no musculoskeletal tenderness, 5/5 strength, moving all extremities  SKIN:   dry, no rash.  Sutures in right 4th finger with surrounding warmth and small amount of serosanguinous discharge. mild swelling.  No streaking, full range of motion. mild surrounding redness without warmth.   NEURO:   AOx3, no focal weakness or loss of sensation, gait normal, GCS 15  PSYCH: calm, cooperative, no apparent risk to self and others

## 2022-03-27 NOTE — ED PROVIDER NOTE - CLINICAL SUMMARY MEDICAL DECISION MAKING FREE TEXT BOX
Patient with mild infection of wound to right 4th finger.  No systemic sx.  Will add keflex and mupirocin ointment.  Sutures left in place.  Return precautions provided and patient to f/u with plastics as well.  Sutures to be removed Wednesday.

## 2022-03-27 NOTE — ED PROVIDER NOTE - PATIENT PORTAL LINK FT
You can access the FollowMyHealth Patient Portal offered by [facility] by registering at the following website: [email or web address]. By joining [employer]’s FollowMyHealth portal, you will also be able to view your health information using other applications (apps) compatible with our system.

## 2022-03-27 NOTE — ED PROVIDER NOTE - OBJECTIVE STATEMENT
51 year old female history of htn, dm presents for wound check of right 4th finger. Patient states that she had sutures done 7 days ago and since yesterday has noted some drainage from the wound and redness around the wound with swelling to her lower hand.  States that she went to her PCP yesterday, was started on bactrim and came today to see if she can get her sutures removed.  Denies any fevers, chills, loss of sensation or motor function.

## 2022-03-27 NOTE — ED ADULT NURSE NOTE - CAS ELECT INFOMATION PROVIDED
pt evaluated, treated and discharged by Angelica NP. No nursing intervention needed./DC instructions

## 2022-03-27 NOTE — ED PROVIDER NOTE - ATTENDING CONTRIBUTION TO CARE
51yoF with h/o HTN, DM, 51yoF with h/o HTN, DM on metformin, presents for check of her finger, states she cut it on a fractured cleaning device 1 wk ago, was repaired here at that time, since yesterday having some pus draining from the suture site and given Bactrim DS by PMD of which she has taken 2 doses thus far. Denies fever, chills, and all other symptoms. On exam, afebrile, hemodynamically stable, saturating well, NAD, well appearing, sitting comfortably in bed, no WOB, speaking full sentences, head NCAT, EOMI grossly, anicteric, breathing comfortably on RA, AAO, CN's 3-12 grossly intact, BAKER spontaneously, no leg cyanosis or edema, skin warm, well perfused, noted suture site to R 4th finger clean, intact. Some proximal erythema without swelling/crepitus/induration/fluctuance, nml distal warmth/color/sensation, <2 sec cap refill. No e/o dehiscense. No tracking erythema. Appearance of some cellulitis to area. Able to express some serosanguineous nonpurulent fluid from suture site, and expressed as well as able. Appearance of localized cellulitis without spread or systemic effect. Will add cephalexin and mupirocin to abx regimen and will return in 3 days, also given plastics f/u. Patient is well appearing, NAD, afebrile, hemodynamically stable. Discharged with instructions in further symptomatic care, return precautions, and f/u.

## 2022-03-27 NOTE — ED PROVIDER NOTE - NS ED ATTENDING STATEMENT MOD
This was a shared visit with the SANJIV. I reviewed and verified the documentation and independently performed the documented:

## 2022-03-30 ENCOUNTER — EMERGENCY (EMERGENCY)
Facility: HOSPITAL | Age: 52
LOS: 1 days | Discharge: HOME | End: 2022-03-30
Attending: EMERGENCY MEDICINE
Payer: COMMERCIAL

## 2022-03-30 ENCOUNTER — APPOINTMENT (OUTPATIENT)
Dept: INTERNAL MEDICINE | Facility: CLINIC | Age: 52
End: 2022-03-30
Payer: MEDICAID

## 2022-03-30 VITALS
SYSTOLIC BLOOD PRESSURE: 148 MMHG | TEMPERATURE: 98 F | RESPIRATION RATE: 19 BRPM | DIASTOLIC BLOOD PRESSURE: 89 MMHG | HEIGHT: 63 IN | OXYGEN SATURATION: 97 % | HEART RATE: 100 BPM | WEIGHT: 166.01 LBS

## 2022-03-30 DIAGNOSIS — L03.90 CELLULITIS, UNSPECIFIED: ICD-10-CM

## 2022-03-30 LAB
ALBUMIN SERPL ELPH-MCNC: 4.9 G/DL
ALP BLD-CCNC: 81 U/L
ALT SERPL-CCNC: 36 U/L
ANION GAP SERPL CALC-SCNC: 16 MMOL/L
AST SERPL-CCNC: 17 U/L
BILIRUB SERPL-MCNC: 0.5 MG/DL
BUN SERPL-MCNC: 20 MG/DL
CALCIUM SERPL-MCNC: 9.8 MG/DL
CHLORIDE SERPL-SCNC: 101 MMOL/L
CHOLEST SERPL-MCNC: 195 MG/DL
CO2 SERPL-SCNC: 22 MMOL/L
CREAT SERPL-MCNC: 0.56 MG/DL
EGFR: 110 ML/MIN/1.73M2
ESTIMATED AVERAGE GLUCOSE: 146 MG/DL
GLUCOSE SERPL-MCNC: 131 MG/DL
HBA1C MFR BLD HPLC: 6.7 %
HDLC SERPL-MCNC: 66 MG/DL
LDLC SERPL CALC-MCNC: 107 MG/DL
NONHDLC SERPL-MCNC: 129 MG/DL
POTASSIUM SERPL-SCNC: 4.3 MMOL/L
PROT SERPL-MCNC: 7.2 G/DL
SODIUM SERPL-SCNC: 140 MMOL/L
TRIGL SERPL-MCNC: 113 MG/DL

## 2022-03-30 PROCEDURE — 99442: CPT

## 2022-03-30 PROCEDURE — G0463: CPT

## 2022-03-30 PROCEDURE — L9995: CPT

## 2022-03-30 NOTE — ED PROVIDER NOTE - OBJECTIVE STATEMENT
Patient had laceration to right hand sutured on 3/20. Developed infection, 4 days ago started on keflex and mupirocin. Wound feels much better. Drainage stopped, swelling markedly improved. No fever, focal weakness, paresthesias.

## 2022-03-30 NOTE — ED ADULT NURSE NOTE - CAS ELECT INFOMATION PROVIDED
pt evaluated, treated and discharged by Brennon FERNANDEZ. No nursing intervention needed./DC instructions

## 2022-03-30 NOTE — ED PROVIDER NOTE - CPE EDP SKIN NORM
28-year-old female status post podiatry surgery right great toe 6 weeks ago. Difficulty with wound healing and was using a silver impregnated dressing and then Betadine cream to the wound. Over the last week she's had a rash that is both itchy and painful with slight pustular drainage on the anterior tibial surface of the same leg. No fever or chills. No vomiting. The rash does not occur anywhere else. The history is provided by the patient. Skin Problem This is a new problem. The current episode started more than 1 week ago. The problem has been gradually worsening. There has been no fever. The rash is present on the right lower leg. The pain is mild. Associated symptoms include blisters, itching, pain and weeping. She has tried nothing for the symptoms. Past Medical History:  
Diagnosis Date  Anemia   
 not currently taking iron supplements; had hysterectomy  Arthritis   
 foot  GERD (gastroesophageal reflux disease)  History of chicken pox  Hypertension   
 pt denies  OAB (overactive bladder) 7/3/2014 Past Surgical History:  
Procedure Laterality Date  HX  SECTION    
 x3  
 HX DILATION AND CURETTAGE    
 HX HYSTERECTOMY  2013 [facility]  
 HX ORTHOPAEDIC    
 right ankle  HX TUBAL LIGATION Family History:  
Problem Relation Age of Onset  Hypertension Father  Diabetes Father  Colon Cancer Father 76  No Known Problems Mother  Malignant Hyperthermia Neg Hx  Pseudocholinesterase Deficiency Neg Hx  Delayed Awakening Neg Hx  Post-op Nausea/Vomiting Neg Hx  Emergence Delirium Neg Hx  Post-op Cognitive Dysfunction Neg Hx   
 Other Neg Hx  Cancer Neg Hx   
 Heart Attack Neg Hx  Breast Cancer Neg Hx   
 Ovarian Cancer Neg Hx Social History Socioeconomic History  Marital status: SINGLE Spouse name: Not on file  Number of children: Not on file  Years of education: Not on file  Highest education level: Not on file Social Needs  Financial resource strain: Not on file  Food insecurity - worry: Not on file  Food insecurity - inability: Not on file  Transportation needs - medical: Not on file  Transportation needs - non-medical: Not on file Occupational History  Not on file Tobacco Use  Smoking status: Current Every Day Smoker Packs/day: 1.00 Years: 35.00 Pack years: 35.00  Smokeless tobacco: Never Used  Tobacco comment: 16 years Substance and Sexual Activity  Alcohol use: Yes Alcohol/week: 0.0 oz  
  Comment: occosaional/social  
 Drug use: No  
 Sexual activity: Yes  
  Partners: Male Birth control/protection: Surgical  
Other Topics Concern  Not on file Social History Narrative  Not on file ALLERGIES: Patient has no known allergies. Review of Systems Constitutional: Negative for chills and fever. Skin: Positive for color change, itching, rash and wound. Neurological: Negative for weakness and numbness. Vitals:  
 02/17/19 7735 BP: (!) 173/99 Pulse: 86 Resp: 16 Temp: 99.1 °F (37.3 °C) SpO2: 99% Weight: 54.4 kg (120 lb) Height: 4' 11\" (1.499 m) Physical Exam  
Constitutional: She appears well-developed and well-nourished. No distress. Skin: Skin is warm and dry. Dorsal incision right great toe with minimal drainage. Anterior tibial surface right leg with about 7 lesions 1-2 cm diameter of erythema with slight induration. Central, shallow ulceration or with minimal drainage. Slight warmth and tenderness. No abscess or crepitus. Nursing note and vitals reviewed. MDM Number of Diagnoses or Management Options Diagnosis management comments: Cellulitis, Ecthyma. Needs antibiotics Amount and/or Complexity of Data Reviewed Independent visualization of images, tracings, or specimens: yes Risk of Complications, Morbidity, and/or Mortality Presenting problems: low Diagnostic procedures: minimal 
Management options: low Patient Progress Patient progress: stable Procedures normal...

## 2022-03-30 NOTE — ED ADULT NURSE NOTE - CAS EDN DISCHARGE ASSESSMENT
Medication:   Requested Prescriptions     Pending Prescriptions Disp Refills    EUTHYROX 75 MCG tablet [Pharmacy Med Name: Euthyrox 75 MCG Oral Tablet] 90 tablet 0     Sig: Take 1 tablet by mouth once daily     Last Filled:  3/13/20      Last appt: 7/6/2020   Next appt: Visit date not found    Last Thyroid:   Lab Results   Component Value Date    TSH 0.39 07/06/2020
Alert and oriented to person, place and time

## 2022-03-30 NOTE — HISTORY OF PRESENT ILLNESS
[de-identified] : This visit was provided via TELEPHONE. The patient, URBANO CASTELLANO , was located at home,[address]\[place] [address] , at the time of the visit. \par The provider,GERRI LIU , was located at his medical office located in  at the time of the visit. The patient, and Provider participated in the TELEPHONE\par Verbal consent given on Mar 30 2022  6:00PM by the patient.\par \par \par pt was in ED and s/p abx; erythema improving; stitches removed; \par \par advised to continue to finish abx\par \par if persistent or worsening, TEB next week \par \par I spend a total of 15 minutes on the date of the encounter evaluating and treating patient\par \par \par

## 2022-03-30 NOTE — ED PROVIDER NOTE - NS ED MD DISPO DISCHARGE CCDA
pt c/o "generalized chest pain/heaviness since 1900 with some SOB"
Patient/Caregiver provided printed discharge information.

## 2022-03-30 NOTE — ED PROVIDER NOTE - NSFOLLOWUPINSTRUCTIONS_ED_ALL_ED_FT
Stitches Removal    WHAT YOU NEED TO KNOW:    Stitches are usually removed within 14 days, depending on the location of the wound. Your healthcare provider will tell you when to return to have your stitches removed. Your provider will use sterile forceps or tweezers to  the knot of each stitch. He or she will cut the stitch with scissors and pull the stitch out. You may feel a slight tug as the stitch comes out.    DISCHARGE INSTRUCTIONS:    Return to the emergency department if:   •Your wound splits open or is starting to come apart.      •You suddenly cannot move your injured joint.      •You have sudden numbness around your wound.      •You see red streaks coming from your wound.      Contact your healthcare provider if:   •You have a fever and chills.      •Your wound is red, warm, swollen, or leaking pus.      •There is a bad smell coming from your wound.      •You have increased pain in the wound area.      •You have questions or concerns about your condition or care.      Care for the area after the stitches are removed:   •Do not pull medical tape off. Your provider may place small strips of medical tape across your wound after the stitches have been removed. These strips will peel and fall of on their own. Do not pull them off.      •Clean the area as directed. Carefully wash the area with soap and water. Pat the area dry with a clean towel. Check the area for signs of infection, such as redness, swelling, or pus. Also check that the wound is not coming apart.      •Protect your wound. Your wound can swell, bleed, or split open if it is stretched or bumped. You may need to wear a bandage that supports your wound until it is completely healed.      •Care for a scar. You may have a scar after the stitches are removed. Use sunblock if the area is exposed to the sun. Apply it every day after the stitches are removed. This will help prevent skin discoloration. Talk to your healthcare provider about medicines you can use to make the scar less visible. Some medicines are available without a prescription.      Follow up with your healthcare provider as directed: You may need to return in 3 to 5 days if the stitches are on your face. Stitches on your scalp need to be removed after 7 to 14 days. Stitches over joints may remain in place up to 14 days. Write down your questions so you remember to ask them during your visits.        © Copyright [employer] 2022           back to top                          © Copyright [employer] 2022

## 2022-03-30 NOTE — ED PROVIDER NOTE - CLINICAL SUMMARY MEDICAL DECISION MAKING FREE TEXT BOX
Patient presents for suture removal. No infection, good healing in progress. instructed to finish out keflex and mupirocin as directed on package. Return to the ED immediately if getting worse, not improving, or if having any new or troubling symptoms.

## 2022-03-30 NOTE — ED PROVIDER NOTE - PATIENT PORTAL LINK FT
You can access the FollowMyHealth Patient Portal offered by [facility] by registering at the following website: [email or web address]. By joining [employer]’s FollowMyHealth portal, you will also be able to view your health information using other applications (apps) compatible with our system.

## 2022-03-30 NOTE — ED PROCEDURE NOTE - CPROC ED TIME OUT STATEMENT1
Your test for COVID-19, also known as novel coronavirus, came back negative. No virus was detected from the sample collected. Testing is not 100%. Until your symptoms are fully resolved, you may still be contagious. We recommend that you remain isolated for 7 days minimum or 72 hours after your symptoms have completely resolved, whichever is longer. If you were exposed to a known positive COVID-19 patient, then you must remain isolated for 14 days. If you were tested for a pre-op, then you remain in isolated until your procedure. Continually monitor symptoms. Contact a medical provider if symptoms are worsening. If you have any additional questions, contact your PCP.     For additional information, please visit the Centers for Disease Control and Prevention [employer]dk “Patient's name, , procedure and correct site were confirmed during the [facility] Time[facility].”

## 2022-03-30 NOTE — ED ADULT TRIAGE NOTE - BP NONINVASIVE DIASTOLIC (MM HG)
Refill Authorization Note     is requesting a refill authorization.    Brief assessment and rationale for refill: QUICK DC: Pravastatin -RTS 7/19; DEFER; ALPRAZOLAM (outside of protocol)  Amount/Quantity of medication ordered: 90d        Refills Authorized: Yes  If authorized number of refills: 0        Medication-related problems identified: Therapeutic duplication  Medication Therapy Plan: QUICK DC: Pravastatin -RTS 7/19; DEFER; ALPRAZOLAM (outside of protocol)  Name and strength of medication: PRAVASTATIN 20MG TABLETS  How patient will take medication: t1t qpm  Medication reconciliation completed: No        Comments:   
89

## 2022-03-31 PROBLEM — E11.9 TYPE 2 DIABETES MELLITUS WITHOUT COMPLICATIONS: Chronic | Status: ACTIVE | Noted: 2022-03-27

## 2022-04-06 NOTE — ASSESSMENT
[FreeTextEntry1] : COVID19 SHOT X2 LAST SHOT IN 03/2021\par \par labs done in 07/2021 discussed with pt in details \par \par anxiety and depression: \par now since pt is start working and socializing; mood improved; \par continue observe without any further intervention \par pt will let me know if nay thing change \par \par \par HTN:\par on amlodipine and losartan\par well control now \par 3 months follow up \par \par \par DMII: well controlled \par saw dietitian\par continue with current meds \par healthy diet and exercise\par saw podiatry \par has not see ophthal advised to follow up \par \par HLD \par very well controlled \par continue with statin\par healthy diet and exercise \par \par \par anxiety and depression and fibromyalgia\par pt saw hand surgery , pmr; had x ray of cervical and NCT test done\par I believe pt's somatic symptoms of hands has psych factor especailly she has a lot stress at home \par doing well and continue with Cymbalta \par -pt denies of current pregnancy or breast feeding\par -discussed with pt in details that the possible side effect  and adverse reactions of the psych medication; pt aware that the medication might increase suicidal ideation at early phase; pt also agree to call 911 immediately if she experience suicidal or harmful thought; pt also understand that if she want to stop this medication, she need to call the clinic back for further instruction\par -follow up in 2 month\par \par chest pain; had work up done by cardio with TTE and stress and EKG and cxr \par \par joints pain and swelling : \par \par  labs to r/o rheumatoid disease result wnl; \par \par \par thumb pain and stiffness: \par no red flag symptoms\par X ray HAND WNL; \par diclofenac cream as needed\par  ice and rest;\par saw HAND SURGERY pt stated no further work up \par \par \par thyroid nodule;\par TFT wnl \par thyroid US showed benign nodule; will repeat in 1 yr( 09/2022)\par \par \par HX OF H.PYLORI  TREATED BY gi IN 10/2020; confirmed eradication\par \par \par hand wound injury;\par concern for cellulitis;\par start Bactrim and probiotic\par 1 weeks follow up \par \par \par \par 2 month follow up on site\par \par \par \par

## 2022-04-06 NOTE — HEALTH RISK ASSESSMENT
[1 or 2 (0 pts)] : 1 or 2 (0 points) [Never (0 pts)] : Never (0 points) [No] : In the past 12 months have you used drugs other than those required for medical reasons? No [1] : 1) Little interest or pleasure doing things for several days (1) [3] : 2) Feeling down, depressed, or hopeless for nearly every day (3) [Patient reported mammogram was normal] : Patient reported mammogram was normal [Patient reported PAP Smear was normal] : Patient reported PAP Smear was normal [Patient reported colonoscopy was abnormal] : Patient reported colonoscopy was abnormal [HIV test declined] : HIV test declined [Hepatitis C test offered] : Hepatitis C test offered [] :  [Employed] : employed [Feels Safe at Home] : Feels safe at home [[id number]] : 4 [Change in mental status noted] : No change in mental status noted [Language] : denies difficulty with language [Behavior] : denies difficulty with behavior [Learning/Retaining New Information] : denies difficulty learning/retaining new information [Handling Complex Tasks] : denies difficulty handling complex tasks [Reasoning] : denies difficulty with reasoning [Spatial Ability and Orientation] : denies difficulty with spatial ability and orientation [Sexually Active] : not sexually active [High Risk Behavior] : no high risk behavior [MammogramDate] : 07/2021 [PapSmearDate] : 07/2021 [ColonoscopyDate] : 01/2020 [ColonoscopyComments] : REPEAT IN 2025; [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

## 2022-04-06 NOTE — HISTORY OF PRESENT ILLNESS
[de-identified] : 51 y.o chinese speaking F w/pmhX of prior CP, URINARY incontinent , HTN, HLD, DMII, chronic thumb pain, anxiety and depression  HERE TO follow up;\par \par PRIOR PCP WAS ( [phone]) in [place]; \par \par Chest pain saw cardio . Patient underwent an echocardiogram and it showed normal LV function without significant valvular pathology. Patient underwent a treadmill stress test and completed 9.5 minutes of Brennon protocol. There were upsloping ST depressions on ECG but no symptoms. Patient was felt to be at low risk for having significant CAD and CXR negative;\par last TTE in 06/2021 wnl \par \par dmii: LAST TIME SAW Ophthal WAS IN 03/2021; normal per pt; saw dietitian already has not see podiatry \par \par HTN: home BP log with BP 110s-130s/ 80s ; denies of any symptoms; again did not take home BP meds; denies of any symptoms \par \par HLD: was on fish oil \par \par B/L HAND JOINTS PAIN AND THUMB PAIN: for 1 -2 months; was seeing Dr.Wensong Dutta PMR( [phone]) WAS on PHYSICAL Therapy; AND ON Gabapentin; PT WAS NOT ABLE TO TOLERATE WITH Gabapentin; SHE had SOB AND cp WHEN SHE WAS ON Gabapentin; NO TRaUMA OR INJURY ;NO Weakness OR Numbness; x RAY HAND WNL; LABS DOSE NOT Indicate ANY RHEUMATOID DISEASE;\par \par HAD X RAY LUMBAR AND CERVICAL DONE BY PMR IN 06/2021 WITH GROSSLY NEGATIVE RESULTS Besides SOME MILD DGENERATIVE CHANGE OF Lumbar DISC \par \par WAS ON STRESS DUE TO PT'S STEP Parent  WAS SICK IN FAMILY\par \par anxiety and DEPRESSION : GAD7 =17  phq 7 =11 no jonathan/ no hallucination/ No delusions /no harmful ideation/ HAS Suicidal IDEATION ONCE BUT NO SPECIFIC PLAN \par \par thyroid nodule: has not do thyroid US; \par \par saw UROGYN on meds for stress incontinent; \par \par STILL HAS PAIN OF HANDS Especially OF RIGHT THUMB; \par saw PMR s/p physical therapy and injection with mild relieve\par pt now reported that she had numbness at night and pain; \par saw ([phone]) pmr AND HAD NERVE CONDUCTION TEST DONE WITH SOME IMPINGEMENT WITH lT CERVICAL AREA; \par \par chronic intermittent right knee pain; no recent injury or trauma or swelling or weakness or numbness or fever or chills \par \par now since on Cymbalta; pain improved; tolerate with meds \par  \par  \par recently had hand injury of RIht hand; had stitches 1 week ago; now some swelling; no discharge no fever or chills\par \par \par \par ROS\par \par Constitutional:  no fever and no chills. \par Eyes:  no discharge and no pain. \par HEENT:  no earache and no hearing loss. \par Cardiovascular:  no chest pain, no palpitations and no leg claudication. \par Respiratory:  no shortness of breath, no wheezing and no cough. \par Gastrointestinal:  no abdominal pain, no nausea and no constipation. \par Genitourinary:  no dysuria and no incontinence. \par Musculoskeletal:  no joint pain, no joint stiffness and no joint swelling. \par Integumentary:  no itching and no mole changes. \par Neurological:  no headache, no dizziness and no fainting. \par Psychiatric:  not suicidal, no insomnia, no anxiety and no depression. \par \par Physical Exam\par \par Constitutional:   no acute distress, well nourished, well developed and well-appearing. \par Eyes:  normal sclera/conjunctiva, pupils equal round and reactive to light and extraocular movements intact. \par ENT:  the outer ears and nose were normal in appearance and the oropharynx was normal. \par Neck:  supple, no lymphadenopathy and the thyroid was normal and there were no nodules present. \par Pulmonary:  no respiratory distress, lungs were clear to auscultation bilaterally, no accessory muscle use. \par Cardiac:  normal rate, with a regular rhythm, normal S1 and S2 and no murmur heard. \par Vascular:  there was no peripheral edema. \par Abdomen:  abdomen soft, non-tender, non-distended, no abdominal mass palpated, no HSM and normal bowel sounds. \par Lymphatic:  no posterior cervical lymphadenopathy, no anterior cervical lymphadenopathy. \par Back:  no CVA tenderness and no spinal tenderness. \par Musculoskeletal: no joint swelling and grossly normal strength/tone. \par Skin:  no rash. \par Neurology:  normal gait, coordination grossly intact, no focal deficits and deep tendon reflexes were 2+ and symmetric. \par Psychiatric:  the affect was normal, oriented to person, place, and time and insight and judgment were intact.\par

## 2022-04-11 PROBLEM — Z11.59 SCREENING FOR VIRAL DISEASE: Status: ACTIVE | Noted: 2021-12-29

## 2022-07-18 ENCOUNTER — RX RENEWAL (OUTPATIENT)
Age: 52
End: 2022-07-18

## 2022-07-23 ENCOUNTER — APPOINTMENT (OUTPATIENT)
Dept: INTERNAL MEDICINE | Facility: CLINIC | Age: 52
End: 2022-07-23

## 2022-07-26 ENCOUNTER — EMERGENCY (EMERGENCY)
Facility: HOSPITAL | Age: 52
LOS: 1 days | Discharge: HOME | End: 2022-07-26
Attending: EMERGENCY MEDICINE
Payer: COMMERCIAL

## 2022-07-26 VITALS
DIASTOLIC BLOOD PRESSURE: 85 MMHG | SYSTOLIC BLOOD PRESSURE: 134 MMHG | HEIGHT: 63 IN | RESPIRATION RATE: 19 BRPM | HEART RATE: 85 BPM | WEIGHT: 167.99 LBS | OXYGEN SATURATION: 97 % | TEMPERATURE: 98 F

## 2022-07-26 PROCEDURE — 99282 EMERGENCY DEPT VISIT SF MDM: CPT

## 2022-07-26 PROCEDURE — 99284 EMERGENCY DEPT VISIT MOD MDM: CPT

## 2022-07-26 NOTE — ED ADULT NURSE NOTE - OBJECTIVE STATEMENT
c/o of right hand wound. Stated she had suture done back in March and there's two spots that's non-healing

## 2022-07-26 NOTE — ED PROVIDER NOTE - PATIENT PORTAL LINK FT
You can access the FollowMyHealth Patient Portal offered by [facility] by registering at the following website: [email or web address]. By joining [email or web address] FollowMyHealth portal, you will also be able to view your health information using other applications (apps) compatible with our system.

## 2022-07-26 NOTE — ED PROVIDER NOTE - OBJECTIVE STATEMENT
52 year old female with PMHx of diabetes mellitus and no significant PSHx presents to the ED with complaints of right hand non healing wound. Patient states in March she had a laceration repair around 4th finger and laceration got infected, resolved after some time and then the sutures were removed. Patient endorses laceration site healed well except two pin point areas and at time wound gets bigger, oozes and heals partially. Patient denies numbness, tingling, focal weakness, pain, or any other complaints. NKDA.

## 2022-07-26 NOTE — ED PROVIDER NOTE - CLINICAL SUMMARY MEDICAL DECISION MAKING FREE TEXT BOX
52 year old female presenting with 2 pin point non healing wound on right hand. Possible foreign body, example: sutures. Will refer to plastic surgery for evaluation. 52 year old female presenting with 2 pin point non healing wound on right hand. Possible foreign body like suture. Will refer to plastic surgery for evaluation.

## 2022-07-26 NOTE — ED PROVIDER NOTE - NSFOLLOWUPINSTRUCTIONS_ED_ALL_ED_FT
Follow up with the hand or plastic surgeon for evaluation within 1 week.  If you experience any new or worsening symptoms or if you are concerned you can always come back to the emergency for a re-evaluation.

## 2022-07-26 NOTE — ED PROVIDER NOTE - SKIN, MLM
Right hand dorsal aspect base of ring finger, 2 pin point scabs across each other. No drainage, no erythema, no fluctuance, no induration, no tenderness to palpation.

## 2022-07-26 NOTE — ED PROVIDER NOTE - ATTENDING APP SHARED VISIT CONTRIBUTION OF CARE
52 year old female presenting with 2 pin point non healing wound on right hand. Possible foreign body like suture. Will refer to plastic surgery for evaluation.

## 2022-08-20 ENCOUNTER — APPOINTMENT (OUTPATIENT)
Dept: INTERNAL MEDICINE | Facility: CLINIC | Age: 52
End: 2022-08-20

## 2022-08-20 VITALS
SYSTOLIC BLOOD PRESSURE: 121 MMHG | WEIGHT: 160 LBS | DIASTOLIC BLOOD PRESSURE: 89 MMHG | RESPIRATION RATE: 16 BRPM | HEIGHT: 62 IN | BODY MASS INDEX: 29.44 KG/M2 | OXYGEN SATURATION: 97 % | HEART RATE: 89 BPM | TEMPERATURE: 96.9 F

## 2022-08-20 DIAGNOSIS — Z12.39 ENCOUNTER FOR OTHER SCREENING FOR MALIGNANT NEOPLASM OF BREAST: ICD-10-CM

## 2022-08-20 PROCEDURE — 99215 OFFICE O/P EST HI 40 MIN: CPT

## 2022-08-20 RX ORDER — METRONIDAZOLE 7.5 MG/G
0.75 GEL VAGINAL
Qty: 1 | Refills: 0 | Status: DISCONTINUED | COMMUNITY
Start: 2021-07-09 | End: 2022-08-20

## 2022-08-20 RX ORDER — OMEPRAZOLE 20 MG/1
20 CAPSULE, DELAYED RELEASE ORAL
Qty: 30 | Refills: 0 | Status: DISCONTINUED | COMMUNITY
Start: 2021-06-14 | End: 2022-08-20

## 2022-08-20 RX ORDER — GABAPENTIN 100 MG/1
100 CAPSULE ORAL
Qty: 30 | Refills: 0 | Status: DISCONTINUED | COMMUNITY
Start: 2021-07-10 | End: 2022-08-20

## 2022-08-20 RX ORDER — HYDROCORTISONE 10 MG/G
1 CREAM TOPICAL
Qty: 28 | Refills: 0 | Status: DISCONTINUED | COMMUNITY
Start: 2021-03-08 | End: 2022-08-20

## 2022-08-20 RX ORDER — CHLORHEXIDINE GLUCONATE, 0.12% ORAL RINSE 1.2 MG/ML
0.12 SOLUTION DENTAL
Qty: 473 | Refills: 0 | Status: DISCONTINUED | COMMUNITY
Start: 2021-06-02 | End: 2022-08-20

## 2022-08-20 RX ORDER — MULTIVIT-MIN/FOLIC/VIT K/LYCOP 400-300MCG
25 MCG TABLET ORAL
Qty: 30 | Refills: 0 | Status: DISCONTINUED | COMMUNITY
Start: 2021-06-29 | End: 2022-08-20

## 2022-08-20 RX ORDER — LANCETS 30 GAUGE
EACH MISCELLANEOUS
Qty: 100 | Refills: 0 | Status: DISCONTINUED | COMMUNITY
Start: 2021-06-29 | End: 2022-08-20

## 2022-08-20 RX ORDER — DICLOFENAC SODIUM 1% 10 MG/G
1 GEL TOPICAL
Qty: 2 | Refills: 1 | Status: DISCONTINUED | COMMUNITY
Start: 2021-09-25 | End: 2022-08-20

## 2022-08-20 RX ORDER — SULFAMETHOXAZOLE AND TRIMETHOPRIM 800; 160 MG/1; MG/1
800-160 TABLET ORAL TWICE DAILY
Qty: 10 | Refills: 0 | Status: DISCONTINUED | COMMUNITY
Start: 2022-03-26 | End: 2022-08-20

## 2022-08-20 RX ORDER — FLUCONAZOLE 150 MG/1
150 TABLET ORAL
Qty: 1 | Refills: 0 | Status: DISCONTINUED | COMMUNITY
Start: 2021-02-20 | End: 2022-08-20

## 2022-08-20 RX ORDER — TROSPIUM CHLORIDE 60 MG/1
60 CAPSULE, EXTENDED RELEASE ORAL
Qty: 90 | Refills: 3 | Status: DISCONTINUED | COMMUNITY
Start: 2021-07-27 | End: 2022-08-20

## 2022-08-20 RX ORDER — NYSTATIN 100000 [USP'U]/G
100000 CREAM TOPICAL
Qty: 15 | Refills: 0 | Status: DISCONTINUED | COMMUNITY
Start: 2021-02-22 | End: 2022-08-20

## 2022-08-20 RX ORDER — CIPROFLOXACIN AND DEXAMETHASONE 3; 1 MG/ML; MG/ML
0.3-0.1 SUSPENSION/ DROPS AURICULAR (OTIC)
Qty: 8 | Refills: 0 | Status: DISCONTINUED | COMMUNITY
Start: 2021-06-29 | End: 2022-08-20

## 2022-08-20 RX ORDER — LOSARTAN POTASSIUM 50 MG/1
50 TABLET, FILM COATED ORAL
Qty: 30 | Refills: 0 | Status: DISCONTINUED | COMMUNITY
Start: 2021-06-14 | End: 2022-08-20

## 2022-08-20 RX ORDER — ISOPROPYL ALCOHOL 70 ML/100ML
70 SWAB TOPICAL
Qty: 100 | Refills: 0 | Status: DISCONTINUED | COMMUNITY
Start: 2021-06-29 | End: 2022-08-20

## 2022-08-20 RX ORDER — TRIAMCINOLONE ACETONIDE 1 MG/G
0.1 CREAM TOPICAL
Qty: 15 | Refills: 0 | Status: DISCONTINUED | COMMUNITY
Start: 2021-02-22 | End: 2022-08-20

## 2022-08-20 RX ORDER — POTASSIUM BICARB/MAG COMBO 21 500-300 MG
POWDER EFFERVESCENT (GRAM) ORAL
Qty: 28 | Refills: 0 | Status: DISCONTINUED | COMMUNITY
Start: 2022-03-26 | End: 2022-08-20

## 2022-08-20 RX ORDER — B-COMPLEX WITH VITAMIN C
500-200 TABLET ORAL
Qty: 60 | Refills: 0 | Status: DISCONTINUED | COMMUNITY
Start: 2021-07-10 | End: 2022-08-20

## 2022-08-20 RX ORDER — KETOTIFEN FUMARATE 0.25 MG/ML
0.03 SOLUTION/ DROPS OPHTHALMIC
Qty: 5 | Refills: 0 | Status: DISCONTINUED | COMMUNITY
Start: 2021-03-20 | End: 2022-08-20

## 2022-08-20 RX ORDER — HYDROCORTISONE 10 MG/G
1 CREAM TOPICAL
Qty: 28 | Refills: 0 | Status: DISCONTINUED | COMMUNITY
Start: 2021-03-06 | End: 2022-08-20

## 2022-08-20 RX ORDER — POLYVINYL ALCOHOL 14 MG/ML
1.4 SOLUTION/ DROPS OPHTHALMIC 4 TIMES DAILY
Qty: 2 | Refills: 5 | Status: DISCONTINUED | COMMUNITY
Start: 2021-03-06 | End: 2022-08-20

## 2022-08-20 RX ORDER — NAPROXEN 375 MG/1
375 TABLET ORAL
Qty: 60 | Refills: 1 | Status: DISCONTINUED | COMMUNITY
Start: 2021-02-22 | End: 2022-08-20

## 2022-08-23 PROBLEM — Z12.39 BREAST CANCER SCREENING: Status: ACTIVE | Noted: 2022-08-20

## 2022-08-23 RX ORDER — POLYETHYLENE GLYCOL, PROPYLENE GLYCOL .4; .3 G/100ML; G/100ML
0.4-0.3 LIQUID OPHTHALMIC
Qty: 15 | Refills: 0 | Status: DISCONTINUED | COMMUNITY
Start: 2022-08-01

## 2022-08-23 RX ORDER — CEPHALEXIN 500 MG/1
500 CAPSULE ORAL
Qty: 28 | Refills: 0 | Status: DISCONTINUED | COMMUNITY
Start: 2022-03-27

## 2022-08-23 RX ORDER — MUPIROCIN 20 MG/G
2 OINTMENT TOPICAL
Qty: 22 | Refills: 0 | Status: DISCONTINUED | COMMUNITY
Start: 2022-03-27

## 2022-08-23 RX ORDER — DIPHENHYDRAMINE HCL 50 MG/1
50 CAPSULE ORAL
Qty: 15 | Refills: 0 | Status: DISCONTINUED | COMMUNITY
Start: 2022-03-20

## 2022-08-23 NOTE — HEALTH RISK ASSESSMENT
[Patient reported mammogram was normal] : Patient reported mammogram was normal [Patient reported PAP Smear was normal] : Patient reported PAP Smear was normal [Patient reported colonoscopy was normal] : Patient reported colonoscopy was normal [MammogramDate] : 07/2021 [MammogramComments] : due [PapSmearDate] : 2021 [ColonoscopyDate] : 2020

## 2022-08-23 NOTE — HISTORY OF PRESENT ILLNESS
[FreeTextEntry1] : establish care [de-identified] : 52yoF, Mandarin  [id number] PMH DM2, HTN, anxiety presents to establish care\par \par fasting -188\par not adherent to low CHO diet

## 2022-09-16 LAB
ALBUMIN SERPL ELPH-MCNC: 4.7 G/DL
ALP BLD-CCNC: 88 U/L
ALT SERPL-CCNC: 50 U/L
ANION GAP SERPL CALC-SCNC: 12 MMOL/L
AST SERPL-CCNC: 44 U/L
BILIRUB SERPL-MCNC: 0.8 MG/DL
BUN SERPL-MCNC: 14 MG/DL
CALCIUM SERPL-MCNC: 10 MG/DL
CHLORIDE SERPL-SCNC: 104 MMOL/L
CHOLEST SERPL-MCNC: 174 MG/DL
CO2 SERPL-SCNC: 27 MMOL/L
CREAT SERPL-MCNC: 0.6 MG/DL
CREAT SPEC-SCNC: 244 MG/DL
EGFR: 108 ML/MIN/1.73M2
ESTIMATED AVERAGE GLUCOSE: 189 MG/DL
FRUCTOSAMINE SERPL-MCNC: 365 UMOL/L
GLUCOSE SERPL-MCNC: 139 MG/DL
HBA1C MFR BLD HPLC: 8.2 %
HDLC SERPL-MCNC: 49 MG/DL
LDLC SERPL CALC-MCNC: 88 MG/DL
MICROALBUMIN 24H UR DL<=1MG/L-MCNC: 1.8 MG/DL
MICROALBUMIN/CREAT 24H UR-RTO: 7 MG/G
NONHDLC SERPL-MCNC: 125 MG/DL
POTASSIUM SERPL-SCNC: 4.6 MMOL/L
PROT SERPL-MCNC: 7.2 G/DL
SODIUM SERPL-SCNC: 143 MMOL/L
TRIGL SERPL-MCNC: 184 MG/DL

## 2022-10-25 NOTE — ED PROVIDER NOTE - BIRTH SEX
Detail Level: Detailed Depth Of Biopsy: dermis Was A Bandage Applied: Yes Size Of Lesion In Cm: 0 Biopsy Type: H and E Biopsy Method: double edge Personna blade Anesthesia Type: 1% lidocaine with epinephrine Anesthesia Volume In Cc: 1 Hemostasis: Candy's Wound Care: Vaseline Dressing: bandage Destruction After The Procedure: No Female Type Of Destruction Used: Curettage Curettage Text: The wound bed was treated with curettage after the biopsy was performed. Cryotherapy Text: The wound bed was treated with cryotherapy after the biopsy was performed. Electrodesiccation Text: The wound bed was treated with electrodesiccation after the biopsy was performed. Electrodesiccation And Curettage Text: The wound bed was treated with electrodesiccation and curettage after the biopsy was performed. Silver Nitrate Text: The wound bed was treated with silver nitrate after the biopsy was performed. Lab: [id number] Consent was obtained and risks were reviewed including but not limited to scarring, infection, bleeding, scabbing, incomplete removal, nerve damage and allergy to anesthesia. Post-Care Instructions: I reviewed with the patient in detail post-care instructions. Patient is to keep the biopsy site dry overnight, and then apply bacitracin twice daily until healed. Patient may apply hydrogen peroxide soaks to remove any crusting. Notification Instructions: Patient will be notified of biopsy results. However, patient instructed to call the office if not contacted within 2 weeks. Billing Type: Third-Party Bill Information: Selecting Yes will display possible errors in your note based on the variables you have selected. This validation is only offered as a suggestion for you. PLEASE NOTE THAT THE VALIDATION TEXT WILL BE REMOVED WHEN YOU FINALIZE YOUR NOTE. IF YOU WANT TO FAX A PRELIMINARY NOTE YOU WILL NEED TO TOGGLE THIS TO 'NO' IF YOU DO NOT WANT IT IN YOUR FAXED NOTE.

## 2022-11-28 ENCOUNTER — APPOINTMENT (OUTPATIENT)
Dept: INTERNAL MEDICINE | Facility: CLINIC | Age: 52
End: 2022-11-28

## 2022-11-28 VITALS
TEMPERATURE: 97.3 F | HEART RATE: 95 BPM | OXYGEN SATURATION: 98 % | SYSTOLIC BLOOD PRESSURE: 147 MMHG | RESPIRATION RATE: 16 BRPM | DIASTOLIC BLOOD PRESSURE: 82 MMHG | HEIGHT: 62 IN

## 2022-11-28 DIAGNOSIS — Z23 ENCOUNTER FOR IMMUNIZATION: ICD-10-CM

## 2022-11-28 PROCEDURE — 99214 OFFICE O/P EST MOD 30 MIN: CPT

## 2022-11-30 RX ORDER — POLYVINYL ALCOHOL, POVIDONE .5; .6 G/100ML; G/100ML
0.5-0.6 LIQUID OPHTHALMIC
Qty: 15 | Refills: 0 | Status: DISCONTINUED | COMMUNITY
Start: 2022-11-16

## 2022-11-30 RX ORDER — CICLOPIROX 80 MG/ML
8 SOLUTION TOPICAL
Qty: 7 | Refills: 0 | Status: DISCONTINUED | COMMUNITY
Start: 2022-11-25

## 2022-11-30 RX ORDER — AMMONIUM LACTATE 12 %
12 CREAM (GRAM) TOPICAL
Qty: 280 | Refills: 0 | Status: DISCONTINUED | COMMUNITY
Start: 2022-11-25

## 2022-11-30 RX ORDER — METFORMIN ER 500 MG 500 MG/1
500 TABLET ORAL
Qty: 30 | Refills: 0 | Status: DISCONTINUED | COMMUNITY
Start: 2022-09-10

## 2022-11-30 RX ORDER — MINERAL OIL AND WHITE PETROLATUM 150; 830 MG/G; MG/G
OINTMENT OPHTHALMIC
Qty: 4 | Refills: 0 | Status: DISCONTINUED | COMMUNITY
Start: 2022-11-23

## 2022-11-30 NOTE — HISTORY OF PRESENT ILLNESS
[FreeTextEntry1] : diabetes follow up [de-identified] : 52yoF, Mandarin  [id number] PMH DM2, HTN, fatty liver presents for diabetes follow up\par \par has not yet scheduled fibroscan

## 2023-01-23 ENCOUNTER — TRANSCRIPTION ENCOUNTER (OUTPATIENT)
Age: 53
End: 2023-01-23

## 2023-01-23 NOTE — ED PROVIDER NOTE - TOBACCO USE
Products Recommended: Daily moisturizer with spf. Elta Md lotion , Elta MD daily, Elta MD clear, Cerave AM\\n\\nFor expected Exposure: Elta Pure, Elta Active.  Sun Bum Mineral lip balm
General Sunscreen Counseling: Discussed moisturizer with sunscreen should be applied to exposed areas daily, each am,  For any expected exposure a heavier sunscreen should be applied to all exposed areas and be reapplied every two hours while exposed . The sunscreen should be broad spectrum spf 30-50 , UVA/UVB and contain Zinc and Titanium Dioxide. Recommend Elta MD products. Recommend sunprotective clothing . Such as long sleeve UPF protective shirts, wide brim hats, neck covers and sunglasses. Sunscreens should be applied at least 15 minutes prior to expected sun exposure and then every 2 hours after that as long as sun exposure continues. If swimming or exercising sunscreen should be reapplied every 45 minutes to an hour after getting wet or sweating. I also recommended a  mineral based lip balm with zinc as well. Chemical lip balms should be avoided. Sun protective clothing can be used in lieu of sunscreen but must be worn the entire time you are exposed to the sun's rays.
Detail Level: Zone
Never smoker

## 2023-01-26 NOTE — ED ADULT NURSE NOTE - LOCATION
finger Nsaids Counseling: NSAID Counseling: I discussed with the patient that NSAIDs should be taken with food. Prolonged use of NSAIDs can result in the development of stomach ulcers.  Patient advised to stop taking NSAIDs if abdominal pain occurs.  The patient verbalized understanding of the proper use and possible adverse effects of NSAIDs.  All of the patient's questions and concerns were addressed.

## 2023-02-18 ENCOUNTER — APPOINTMENT (OUTPATIENT)
Dept: INTERNAL MEDICINE | Facility: CLINIC | Age: 53
End: 2023-02-18
Payer: MEDICAID

## 2023-02-18 VITALS
DIASTOLIC BLOOD PRESSURE: 74 MMHG | OXYGEN SATURATION: 98 % | SYSTOLIC BLOOD PRESSURE: 149 MMHG | TEMPERATURE: 97.8 F | HEART RATE: 104 BPM | HEIGHT: 62 IN | WEIGHT: 165 LBS | RESPIRATION RATE: 16 BRPM | BODY MASS INDEX: 30.36 KG/M2

## 2023-02-18 DIAGNOSIS — G56.03 CARPAL TUNNEL SYNDROM,BILATERAL UPPER LIMBS: ICD-10-CM

## 2023-02-18 DIAGNOSIS — R74.01 ELEVATION OF LEVELS OF LIVER TRANSAMINASE LEVELS: ICD-10-CM

## 2023-02-18 LAB
ALBUMIN SERPL ELPH-MCNC: 4.8 G/DL
ALP BLD-CCNC: 97 U/L
ALT SERPL-CCNC: 61 U/L
ANION GAP SERPL CALC-SCNC: 12 MMOL/L
AST SERPL-CCNC: 51 U/L
BILIRUB SERPL-MCNC: 0.5 MG/DL
BUN SERPL-MCNC: 14 MG/DL
CALCIUM SERPL-MCNC: 9.7 MG/DL
CHLORIDE SERPL-SCNC: 101 MMOL/L
CHOLEST SERPL-MCNC: 137 MG/DL
CO2 SERPL-SCNC: 27 MMOL/L
CREAT SERPL-MCNC: 0.53 MG/DL
CREAT SPEC-SCNC: 152 MG/DL
EGFR: 111 ML/MIN/1.73M2
ESTIMATED AVERAGE GLUCOSE: 171 MG/DL
GGT SERPL-CCNC: 78 U/L
GLUCOSE SERPL-MCNC: 141 MG/DL
HBA1C MFR BLD HPLC: 7.6 %
HDLC SERPL-MCNC: 48 MG/DL
LDLC SERPL CALC-MCNC: 55 MG/DL
MICROALBUMIN 24H UR DL<=1MG/L-MCNC: 1.4 MG/DL
MICROALBUMIN/CREAT 24H UR-RTO: 9 MG/G
NONHDLC SERPL-MCNC: 89 MG/DL
POTASSIUM SERPL-SCNC: 4.4 MMOL/L
PROT SERPL-MCNC: 7.2 G/DL
SODIUM SERPL-SCNC: 140 MMOL/L
TRIGL SERPL-MCNC: 170 MG/DL

## 2023-02-18 PROCEDURE — 99214 OFFICE O/P EST MOD 30 MIN: CPT

## 2023-02-18 RX ORDER — METFORMIN HYDROCHLORIDE 1000 MG/1
1000 TABLET, EXTENDED RELEASE ORAL DAILY
Qty: 90 | Refills: 3 | Status: DISCONTINUED | COMMUNITY
Start: 2021-07-07 | End: 2023-02-18

## 2023-02-18 RX ORDER — A/C/E/ZINC/SOD SELENATE/COPPER 5000-60-30
TABLET ORAL
Refills: 0 | Status: DISCONTINUED | COMMUNITY
Start: 2021-06-29 | End: 2022-08-20

## 2023-02-21 PROBLEM — R74.01 TRANSAMINITIS: Status: ACTIVE | Noted: 2022-09-16

## 2023-02-21 PROBLEM — G56.03 BILATERAL CARPAL TUNNEL SYNDROME: Status: ACTIVE | Noted: 2021-12-29

## 2023-02-21 NOTE — HISTORY OF PRESENT ILLNESS
[FreeTextEntry1] : review labs [de-identified] : 52yoF, Mandarin  [id number] PMH DM2, HTN, fatty liver presents to review labs\par \par transaminitis likely 2/2 fatty liver - \par did not schedule liver us nor fibroscan as advised\par \par endorses b/l hand numbness\par had been prescribed wrist brace by prior pcp

## 2023-03-17 LAB
ALBUMIN SERPL ELPH-MCNC: 4.7 G/DL
ALP BLD-CCNC: 98 U/L
ALT SERPL-CCNC: 53 U/L
AST SERPL-CCNC: 59 U/L
BILIRUB DIRECT SERPL-MCNC: 0.2 MG/DL
BILIRUB INDIRECT SERPL-MCNC: 0.4 MG/DL
BILIRUB SERPL-MCNC: 0.6 MG/DL
CHOLEST SERPL-MCNC: 158 MG/DL
ESTIMATED AVERAGE GLUCOSE: 169 MG/DL
HBA1C MFR BLD HPLC: 7.5 %
HBV SURFACE AB SER QL: ABNORMAL
HBV SURFACE AG SER QL: NONREACTIVE
HCV AB SER QL: NONREACTIVE
HCV S/CO RATIO: 0.06 S/CO
HDLC SERPL-MCNC: 49 MG/DL
LDLC SERPL CALC-MCNC: 74 MG/DL
NONHDLC SERPL-MCNC: 109 MG/DL
PROT SERPL-MCNC: 7.2 G/DL
TRIGL SERPL-MCNC: 179 MG/DL

## 2023-05-06 ENCOUNTER — APPOINTMENT (OUTPATIENT)
Dept: INTERNAL MEDICINE | Facility: CLINIC | Age: 53
End: 2023-05-06

## 2023-07-21 ENCOUNTER — TRANSCRIPTION ENCOUNTER (OUTPATIENT)
Age: 53
End: 2023-07-21

## 2023-07-24 ENCOUNTER — APPOINTMENT (OUTPATIENT)
Dept: INTERNAL MEDICINE | Facility: CLINIC | Age: 53
End: 2023-07-24
Payer: MEDICAID

## 2023-07-24 VITALS
SYSTOLIC BLOOD PRESSURE: 144 MMHG | HEART RATE: 91 BPM | OXYGEN SATURATION: 98 % | RESPIRATION RATE: 16 BRPM | HEIGHT: 62 IN | DIASTOLIC BLOOD PRESSURE: 79 MMHG | TEMPERATURE: 97.7 F | BODY MASS INDEX: 30.73 KG/M2 | WEIGHT: 167 LBS

## 2023-07-24 DIAGNOSIS — B35.1 TINEA UNGUIUM: ICD-10-CM

## 2023-07-24 DIAGNOSIS — R05.9 COUGH, UNSPECIFIED: ICD-10-CM

## 2023-07-24 PROCEDURE — 99214 OFFICE O/P EST MOD 30 MIN: CPT

## 2023-07-24 RX ORDER — BUDESONIDE AND FORMOTEROL FUMARATE DIHYDRATE 160; 4.5 UG/1; UG/1
160-4.5 AEROSOL RESPIRATORY (INHALATION)
Qty: 10 | Refills: 0 | Status: DISCONTINUED | COMMUNITY
Start: 2023-04-28

## 2023-07-24 RX ORDER — MONTELUKAST 10 MG/1
10 TABLET, FILM COATED ORAL
Qty: 30 | Refills: 0 | Status: DISCONTINUED | COMMUNITY
Start: 2023-04-28

## 2023-07-24 RX ORDER — EFINACONAZOLE 100 MG/ML
10 SOLUTION TOPICAL
Qty: 1 | Refills: 5 | Status: ACTIVE | COMMUNITY
Start: 2023-07-24 | End: 1900-01-01

## 2023-07-24 RX ORDER — CLOTRIMAZOLE AND BETAMETHASONE DIPROPIONATE 10; .5 MG/G; MG/G
1-0.05 CREAM TOPICAL TWICE DAILY
Qty: 1 | Refills: 1 | Status: ACTIVE | COMMUNITY
Start: 2023-07-24 | End: 1900-01-01

## 2023-07-26 NOTE — HISTORY OF PRESENT ILLNESS
[FreeTextEntry1] : routine follow up [de-identified] : 52yoF, Mandarin  [facility] DM2, HTN, fatty liver presents for follow up\par \par did not schedule  fibroscan as advised\par \par needs work form completed

## 2023-08-04 ENCOUNTER — TRANSCRIPTION ENCOUNTER (OUTPATIENT)
Age: 53
End: 2023-08-04

## 2023-08-05 ENCOUNTER — TRANSCRIPTION ENCOUNTER (OUTPATIENT)
Age: 53
End: 2023-08-05

## 2023-08-09 ENCOUNTER — TRANSCRIPTION ENCOUNTER (OUTPATIENT)
Age: 53
End: 2023-08-09

## 2023-08-11 ENCOUNTER — TRANSCRIPTION ENCOUNTER (OUTPATIENT)
Age: 53
End: 2023-08-11

## 2023-08-12 ENCOUNTER — TRANSCRIPTION ENCOUNTER (OUTPATIENT)
Age: 53
End: 2023-08-12

## 2023-08-14 ENCOUNTER — TRANSCRIPTION ENCOUNTER (OUTPATIENT)
Age: 53
End: 2023-08-14

## 2023-08-15 ENCOUNTER — TRANSCRIPTION ENCOUNTER (OUTPATIENT)
Age: 53
End: 2023-08-15

## 2023-08-17 ENCOUNTER — TRANSCRIPTION ENCOUNTER (OUTPATIENT)
Age: 53
End: 2023-08-17

## 2023-08-17 LAB
ALBUMIN SERPL ELPH-MCNC: 4.8 G/DL
ALP BLD-CCNC: 87 U/L
ALT SERPL-CCNC: 62 U/L
ANION GAP SERPL CALC-SCNC: 13 MMOL/L
AST SERPL-CCNC: 48 U/L
BILIRUB SERPL-MCNC: 0.6 MG/DL
BUN SERPL-MCNC: 12 MG/DL
CALCIUM SERPL-MCNC: 9.3 MG/DL
CHLORIDE SERPL-SCNC: 103 MMOL/L
CHOLEST SERPL-MCNC: 150 MG/DL
CO2 SERPL-SCNC: 26 MMOL/L
CREAT SERPL-MCNC: 0.51 MG/DL
CREAT SPEC-SCNC: 161 MG/DL
EGFR: 112 ML/MIN/1.73M2
ESTIMATED AVERAGE GLUCOSE: 169 MG/DL
GLUCOSE SERPL-MCNC: 140 MG/DL
HBA1C MFR BLD HPLC: 7.5 %
HDLC SERPL-MCNC: 52 MG/DL
LDLC SERPL CALC-MCNC: 66 MG/DL
M TB IFN-G BLD-IMP: NEGATIVE
MEV IGG FLD QL IA: >300 AU/ML
MEV IGG+IGM SER-IMP: POSITIVE
MICROALBUMIN 24H UR DL<=1MG/L-MCNC: 1.9 MG/DL
MICROALBUMIN/CREAT 24H UR-RTO: 12 MG/G
MUV AB SER-ACNC: POSITIVE
MUV IGG SER QL IA: 109 AU/ML
NONHDLC SERPL-MCNC: 99 MG/DL
POTASSIUM SERPL-SCNC: 4.5 MMOL/L
PROT SERPL-MCNC: 6.8 G/DL
QUANTIFERON TB PLUS MITOGEN MINUS NIL: 9.38 IU/ML
QUANTIFERON TB PLUS NIL: 0.07 IU/ML
QUANTIFERON TB PLUS TB1 MINUS NIL: 0.17 IU/ML
QUANTIFERON TB PLUS TB2 MINUS NIL: 0.21 IU/ML
RUBV IGG FLD-ACNC: 7 INDEX
RUBV IGG SER-IMP: POSITIVE
SODIUM SERPL-SCNC: 142 MMOL/L
TRIGL SERPL-MCNC: 199 MG/DL
VZV AB TITR SER: POSITIVE
VZV IGG SER IF-ACNC: >4000 INDEX

## 2023-08-22 ENCOUNTER — TRANSCRIPTION ENCOUNTER (OUTPATIENT)
Age: 53
End: 2023-08-22

## 2023-08-25 ENCOUNTER — RX RENEWAL (OUTPATIENT)
Age: 53
End: 2023-08-25

## 2023-09-22 ENCOUNTER — RX RENEWAL (OUTPATIENT)
Age: 53
End: 2023-09-22

## 2023-09-22 RX ORDER — ALBUTEROL SULFATE 90 UG/1
108 (90 BASE) INHALANT RESPIRATORY (INHALATION)
Qty: 1 | Refills: 0 | Status: ACTIVE | COMMUNITY
Start: 2023-04-28 | End: 1900-01-01

## 2023-10-04 ENCOUNTER — TRANSCRIPTION ENCOUNTER (OUTPATIENT)
Age: 53
End: 2023-10-04

## 2023-10-04 ENCOUNTER — NON-APPOINTMENT (OUTPATIENT)
Age: 53
End: 2023-10-04

## 2023-10-04 DIAGNOSIS — U07.1 COVID-19: ICD-10-CM

## 2023-10-06 ENCOUNTER — TRANSCRIPTION ENCOUNTER (OUTPATIENT)
Age: 53
End: 2023-10-06

## 2023-10-08 ENCOUNTER — NON-APPOINTMENT (OUTPATIENT)
Age: 53
End: 2023-10-08

## 2023-10-14 ENCOUNTER — APPOINTMENT (OUTPATIENT)
Dept: INTERNAL MEDICINE | Facility: CLINIC | Age: 53
End: 2023-10-14
Payer: COMMERCIAL

## 2023-10-14 VITALS
HEIGHT: 62 IN | SYSTOLIC BLOOD PRESSURE: 147 MMHG | RESPIRATION RATE: 16 BRPM | HEART RATE: 93 BPM | OXYGEN SATURATION: 98 % | BODY MASS INDEX: 29.44 KG/M2 | TEMPERATURE: 97.3 F | WEIGHT: 160 LBS | DIASTOLIC BLOOD PRESSURE: 83 MMHG

## 2023-10-14 PROCEDURE — 99214 OFFICE O/P EST MOD 30 MIN: CPT

## 2023-10-14 RX ORDER — NIRMATRELVIR AND RITONAVIR 300-100 MG
20 X 150 MG & KIT ORAL
Qty: 1 | Refills: 1 | Status: DISCONTINUED | COMMUNITY
Start: 2023-10-04 | End: 2023-10-14

## 2023-10-16 LAB
ALBUMIN SERPL ELPH-MCNC: 4.8 G/DL
ALP BLD-CCNC: 110 U/L
ALT SERPL-CCNC: 85 U/L
ANION GAP SERPL CALC-SCNC: 12 MMOL/L
AST SERPL-CCNC: 67 U/L
BILIRUB DIRECT SERPL-MCNC: 0.1 MG/DL
BILIRUB INDIRECT SERPL-MCNC: 0.3 MG/DL
BILIRUB SERPL-MCNC: 0.4 MG/DL
BUN SERPL-MCNC: 13 MG/DL
CALCIUM SERPL-MCNC: 9.9 MG/DL
CHLORIDE SERPL-SCNC: 99 MMOL/L
CHOLEST SERPL-MCNC: 229 MG/DL
CO2 SERPL-SCNC: 27 MMOL/L
CREAT SERPL-MCNC: 0.52 MG/DL
CREAT SPEC-SCNC: 149 MG/DL
EGFR: 111 ML/MIN/1.73M2
ESTIMATED AVERAGE GLUCOSE: 169 MG/DL
GLUCOSE SERPL-MCNC: 96 MG/DL
HBA1C MFR BLD HPLC: 7.5 %
HDLC SERPL-MCNC: 52 MG/DL
LDLC SERPL CALC-MCNC: 112 MG/DL
MICROALBUMIN 24H UR DL<=1MG/L-MCNC: 3.4 MG/DL
MICROALBUMIN/CREAT 24H UR-RTO: 23 MG/G
NONHDLC SERPL-MCNC: 178 MG/DL
POTASSIUM SERPL-SCNC: 4.1 MMOL/L
PROT SERPL-MCNC: 7.4 G/DL
SODIUM SERPL-SCNC: 137 MMOL/L
TRIGL SERPL-MCNC: 381 MG/DL
VIT B12 SERPL-MCNC: 548 PG/ML

## 2023-10-17 LAB — ALDOSTERONE SERUM: 21.9 NG/DL

## 2023-10-19 LAB — RENIN ACTIVITY, PLASMA: 5.41 NG/ML/HR

## 2023-10-20 LAB
METANEPHRINE, PL: <10 PG/ML
NORMETANEPHRINE, PL: 110.1 PG/ML

## 2023-12-26 ENCOUNTER — RX RENEWAL (OUTPATIENT)
Age: 53
End: 2023-12-26

## 2023-12-27 ENCOUNTER — RX RENEWAL (OUTPATIENT)
Age: 53
End: 2023-12-27

## 2024-01-09 ENCOUNTER — TRANSCRIPTION ENCOUNTER (OUTPATIENT)
Age: 54
End: 2024-01-09

## 2024-01-27 ENCOUNTER — APPOINTMENT (OUTPATIENT)
Dept: INTERNAL MEDICINE | Facility: CLINIC | Age: 54
End: 2024-01-27
Payer: COMMERCIAL

## 2024-01-27 VITALS
HEART RATE: 89 BPM | DIASTOLIC BLOOD PRESSURE: 80 MMHG | WEIGHT: 162 LBS | RESPIRATION RATE: 16 BRPM | OXYGEN SATURATION: 98 % | HEIGHT: 62 IN | BODY MASS INDEX: 29.81 KG/M2 | SYSTOLIC BLOOD PRESSURE: 132 MMHG | TEMPERATURE: 97.7 F

## 2024-01-27 DIAGNOSIS — E11.9 TYPE 2 DIABETES MELLITUS W/OUT COMPLICATIONS: ICD-10-CM

## 2024-01-27 DIAGNOSIS — K76.0 FATTY (CHANGE OF) LIVER, NOT ELSEWHERE CLASSIFIED: ICD-10-CM

## 2024-01-27 DIAGNOSIS — I10 ESSENTIAL (PRIMARY) HYPERTENSION: ICD-10-CM

## 2024-01-27 DIAGNOSIS — M79.7 FIBROMYALGIA: ICD-10-CM

## 2024-01-27 PROCEDURE — G2211 COMPLEX E/M VISIT ADD ON: CPT

## 2024-01-27 PROCEDURE — 99214 OFFICE O/P EST MOD 30 MIN: CPT

## 2024-01-28 PROBLEM — K76.0 NAFLD (NONALCOHOLIC FATTY LIVER DISEASE): Status: ACTIVE | Noted: 2024-01-28

## 2024-01-28 PROBLEM — M79.7 FIBROMYALGIA: Status: ACTIVE | Noted: 2021-12-29

## 2024-01-28 PROBLEM — E11.9 DIABETES MELLITUS, TYPE 2: Status: ACTIVE | Noted: 2021-07-02

## 2024-01-28 PROBLEM — I10 HTN (HYPERTENSION): Status: ACTIVE | Noted: 2021-06-14

## 2024-01-28 PROBLEM — K76.0 NAFLD (NONALCOHOLIC FATTY LIVER DISEASE): Status: RESOLVED | Noted: 2022-11-12 | Resolved: 2024-01-28

## 2024-01-28 NOTE — HISTORY OF PRESENT ILLNESS
[FreeTextEntry1] : routine follow up [de-identified] : 53yoF, Mandarin   [id number]  PMH DM2, HTN, fatty liver presents for follow up  did not schedule hepatology appt or fibroscan as advised - had discussed persistent transaminitis  prior 1/27/24: states completed fibroscan in 11/2023 - did not receive report from [facility] did not schedule hepatology appt as advised repeatedly  did not increase losartan to 50mg as advised at last OV  discontinued duloxetine and body pain resumed

## 2024-02-02 LAB
ALBUMIN SERPL ELPH-MCNC: 5.1 G/DL
ALP BLD-CCNC: 109 U/L
ALT SERPL-CCNC: 42 U/L
ANION GAP SERPL CALC-SCNC: 20 MMOL/L
AST SERPL-CCNC: 37 U/L
BILIRUB SERPL-MCNC: 0.4 MG/DL
BUN SERPL-MCNC: 12 MG/DL
CALCIUM SERPL-MCNC: 9.5 MG/DL
CCP AB SER IA-ACNC: <8 UNITS
CHLORIDE SERPL-SCNC: 100 MMOL/L
CHOLEST SERPL-MCNC: 147 MG/DL
CO2 SERPL-SCNC: 19 MMOL/L
CREAT SERPL-MCNC: 0.47 MG/DL
CREAT SPEC-SCNC: 124 MG/DL
CRP SERPL-MCNC: 4 MG/L
DSDNA AB SER-ACNC: <12 IU/ML
EGFR: 114 ML/MIN/1.73M2
ERYTHROCYTE [SEDIMENTATION RATE] IN BLOOD BY WESTERGREN METHOD: 29 MM/HR
ESTIMATED AVERAGE GLUCOSE: 154 MG/DL
GLUCOSE SERPL-MCNC: 250 MG/DL
HBA1C MFR BLD HPLC: 7 %
HDLC SERPL-MCNC: 44 MG/DL
LDLC SERPL CALC-MCNC: 56 MG/DL
MICROALBUMIN 24H UR DL<=1MG/L-MCNC: 4 MG/DL
MICROALBUMIN/CREAT 24H UR-RTO: 32 MG/G
NONHDLC SERPL-MCNC: 104 MG/DL
POTASSIUM SERPL-SCNC: 3.8 MMOL/L
PROT SERPL-MCNC: 7.2 G/DL
RF+CCP IGG SER-IMP: NEGATIVE
RHEUMATOID FACT SER QL: 10 IU/ML
SODIUM SERPL-SCNC: 139 MMOL/L
TRIGL SERPL-MCNC: 307 MG/DL
VIT B12 SERPL-MCNC: 572 PG/ML

## 2024-02-05 ENCOUNTER — TRANSCRIPTION ENCOUNTER (OUTPATIENT)
Age: 54
End: 2024-02-05

## 2024-02-28 ENCOUNTER — TRANSCRIPTION ENCOUNTER (OUTPATIENT)
Age: 54
End: 2024-02-28

## 2024-02-28 ENCOUNTER — APPOINTMENT (OUTPATIENT)
Dept: HEPATOLOGY | Facility: CLINIC | Age: 54
End: 2024-02-28

## 2024-03-20 ENCOUNTER — RX RENEWAL (OUTPATIENT)
Age: 54
End: 2024-03-20

## 2024-03-20 RX ORDER — CALCIUM CARBONATE/VITAMIN D3 500MG-5MCG
500-5 TABLET ORAL
Qty: 90 | Refills: 1 | Status: ACTIVE | COMMUNITY
Start: 2021-02-22 | End: 1900-01-01

## 2024-06-15 RX ORDER — METFORMIN HYDROCHLORIDE 1000 MG/1
1000 TABLET, COATED ORAL TWICE DAILY
Qty: 180 | Refills: 0 | Status: ACTIVE | COMMUNITY
Start: 2022-09-20 | End: 1900-01-01

## 2024-06-15 RX ORDER — ATORVASTATIN CALCIUM 10 MG/1
10 TABLET, FILM COATED ORAL
Qty: 90 | Refills: 1 | Status: ACTIVE | COMMUNITY
Start: 2021-08-09 | End: 1900-01-01

## 2024-06-15 RX ORDER — AMLODIPINE BESYLATE 10 MG/1
10 TABLET ORAL
Qty: 90 | Refills: 1 | Status: ACTIVE | COMMUNITY
Start: 1900-01-01 | End: 1900-01-01

## 2024-06-15 RX ORDER — LOSARTAN POTASSIUM 50 MG/1
50 TABLET, FILM COATED ORAL
Qty: 90 | Refills: 1 | Status: ACTIVE | COMMUNITY
Start: 1900-01-01 | End: 1900-01-01

## 2024-06-17 ENCOUNTER — TRANSCRIPTION ENCOUNTER (OUTPATIENT)
Age: 54
End: 2024-06-17

## 2024-06-17 RX ORDER — DULOXETINE HYDROCHLORIDE 30 MG/1
30 CAPSULE, DELAYED RELEASE PELLETS ORAL
Qty: 90 | Refills: 0 | Status: ACTIVE | COMMUNITY
Start: 2021-12-29 | End: 1900-01-01

## 2024-09-16 ENCOUNTER — TRANSCRIPTION ENCOUNTER (OUTPATIENT)
Age: 54
End: 2024-09-16

## 2024-09-18 ENCOUNTER — TRANSCRIPTION ENCOUNTER (OUTPATIENT)
Age: 54
End: 2024-09-18

## 2024-09-27 ENCOUNTER — APPOINTMENT (OUTPATIENT)
Dept: INTERNAL MEDICINE | Facility: CLINIC | Age: 54
End: 2024-09-27

## 2024-10-12 ENCOUNTER — APPOINTMENT (OUTPATIENT)
Dept: INTERNAL MEDICINE | Facility: CLINIC | Age: 54
End: 2024-10-12
Payer: COMMERCIAL

## 2024-10-12 ENCOUNTER — NON-APPOINTMENT (OUTPATIENT)
Age: 54
End: 2024-10-12

## 2024-10-12 VITALS
OXYGEN SATURATION: 97 % | WEIGHT: 160 LBS | SYSTOLIC BLOOD PRESSURE: 144 MMHG | HEART RATE: 82 BPM | RESPIRATION RATE: 16 BRPM | TEMPERATURE: 99 F | HEIGHT: 62 IN | DIASTOLIC BLOOD PRESSURE: 84 MMHG | BODY MASS INDEX: 29.44 KG/M2

## 2024-10-12 DIAGNOSIS — E11.9 TYPE 2 DIABETES MELLITUS W/OUT COMPLICATIONS: ICD-10-CM

## 2024-10-12 DIAGNOSIS — I10 ESSENTIAL (PRIMARY) HYPERTENSION: ICD-10-CM

## 2024-10-12 DIAGNOSIS — Z12.39 ENCOUNTER FOR OTHER SCREENING FOR MALIGNANT NEOPLASM OF BREAST: ICD-10-CM

## 2024-10-12 DIAGNOSIS — Z12.4 ENCOUNTER FOR SCREENING FOR MALIGNANT NEOPLASM OF CERVIX: ICD-10-CM

## 2024-10-12 LAB
ALBUMIN SERPL ELPH-MCNC: 4.9 G/DL
ALP BLD-CCNC: 105 U/L
ALT SERPL-CCNC: 81 U/L
ANION GAP SERPL CALC-SCNC: 11 MMOL/L
AST SERPL-CCNC: 73 U/L
BILIRUB SERPL-MCNC: 0.7 MG/DL
BUN SERPL-MCNC: 15 MG/DL
CALCIUM SERPL-MCNC: 9.8 MG/DL
CHLORIDE SERPL-SCNC: 100 MMOL/L
CHOLEST SERPL-MCNC: 173 MG/DL
CO2 SERPL-SCNC: 27 MMOL/L
CREAT SERPL-MCNC: 0.53 MG/DL
CREAT SPEC-SCNC: 227 MG/DL
EGFR: 110 ML/MIN/1.73M2
ESTIMATED AVERAGE GLUCOSE: 177 MG/DL
GLUCOSE SERPL-MCNC: 169 MG/DL
HBA1C MFR BLD HPLC: 7.8 %
HDLC SERPL-MCNC: 49 MG/DL
LDLC SERPL CALC-MCNC: 92 MG/DL
MICROALBUMIN 24H UR DL<=1MG/L-MCNC: 2.9 MG/DL
MICROALBUMIN/CREAT 24H UR-RTO: 13 MG/G
NONHDLC SERPL-MCNC: 124 MG/DL
POTASSIUM SERPL-SCNC: 4.3 MMOL/L
PROT SERPL-MCNC: 7.2 G/DL
SODIUM SERPL-SCNC: 139 MMOL/L
TRIGL SERPL-MCNC: 183 MG/DL

## 2024-10-12 PROCEDURE — G0008: CPT

## 2024-10-12 PROCEDURE — 90656 IIV3 VACC NO PRSV 0.5 ML IM: CPT

## 2024-10-12 PROCEDURE — 99214 OFFICE O/P EST MOD 30 MIN: CPT | Mod: 25

## 2024-10-13 PROBLEM — Z12.4 CERVICAL CANCER SCREENING: Status: ACTIVE | Noted: 2024-10-12

## 2024-10-13 LAB
ENA SS-A AB SER IA-ACNC: <0.2 AL
ENA SS-B AB SER IA-ACNC: <0.2 AL
MEV IGG FLD QL IA: >300 AU/ML
MEV IGG+IGM SER-IMP: POSITIVE
MUV AB SER-ACNC: POSITIVE
MUV IGG SER QL IA: 104 AU/ML
RUBV IGG FLD-ACNC: 6.86 INDEX
RUBV IGG SER-IMP: POSITIVE
VZV AB TITR SER: POSITIVE
VZV IGG SER IF-ACNC: 45.5 S/CO

## 2024-10-14 ENCOUNTER — RESULT REVIEW (OUTPATIENT)
Age: 54
End: 2024-10-14

## 2024-10-14 ENCOUNTER — APPOINTMENT (OUTPATIENT)
Dept: MAMMOGRAPHY | Facility: CLINIC | Age: 54
End: 2024-10-14
Payer: COMMERCIAL

## 2024-10-14 ENCOUNTER — APPOINTMENT (OUTPATIENT)
Dept: ULTRASOUND IMAGING | Facility: CLINIC | Age: 54
End: 2024-10-14
Payer: COMMERCIAL

## 2024-10-14 PROCEDURE — 76641 ULTRASOUND BREAST COMPLETE: CPT | Mod: 50

## 2024-10-14 PROCEDURE — 77067 SCR MAMMO BI INCL CAD: CPT

## 2024-10-14 PROCEDURE — 77063 BREAST TOMOSYNTHESIS BI: CPT

## 2024-10-15 LAB
M TB IFN-G BLD-IMP: NEGATIVE
QUANTIFERON TB PLUS MITOGEN MINUS NIL: >10 IU/ML
QUANTIFERON TB PLUS NIL: 0.06 IU/ML
QUANTIFERON TB PLUS TB1 MINUS NIL: 0.03 IU/ML
QUANTIFERON TB PLUS TB2 MINUS NIL: 0.02 IU/ML

## 2024-10-30 NOTE — ASSESSMENT
[FreeTextEntry1] : COVID19 SHOT X2 LAST SHOT IN 03/2021\par \par labs done in 07/2021 discussed with pt in details \par \par anxiety and depression: \par pt is also anxious during the exam; \par option of treatment discussed with pt again \par PT REFUSE TO take MEDS DESPITE Recommendation; \par REFER TO Psychologist AND Psychiatry again \par \par \par HTN: pt did not take  losartan SELF STOPPED\par Advised WITH Compliance WITH Amlodipine AND LOSARTAN again and explaint the risk \par  bp LOG\par 3 weeks follow up \par \par \par \par DMII: well controlled \par saw dietitian\par continue with current meds \par healthy diet and exercise\par has not see ophthal and podiatry \par \par HLD not well controlled'; need to add statin;\par 1 month follow up for CMP \par \par \par \par \par \par chest pain; had work up doen by cardio with TTE and stress and EKG and cxr \par \par joints pain and swelling : \par \par  labs to r/o rheumatoid disease wnl; \par \par \par thumb pain and stiffness: \par \par no red flag symptoms\par \par  X ray HAND WNL; \par diclofenac cream as needed\par  ice and rest;\par REFER TO HAND SURGERY\par \par \par  thyroid nodule;\par follow up with TFT \par thyroid US\par \par \par HX OF H.PYLORI  TREATED BY gi IN 10/2020; confirmed eradication\par \par \par \par 3 weeks follow up \par \par pt will call me on Wednesday 09/1/2021 for result of preemply and form to fill out\par \par labs as ordered \par \par \par sent TDAP TO [facility] \par \par PT WILL GET BACK TO ME WITH TDAP RECORDS  Render Note In Bullet Format When Appropriate: No Post-Care Instructions: I reviewed with the patient in detail post-care instructions. Patient is to wear sunprotection, and avoid picking at any of the treated lesions. Pt may apply Vaseline to crusted or scabbing areas. Consent: The patient's consent was obtained including but not limited to risks of crusting, scabbing, blistering, scarring, darker or lighter pigmentary change, recurrence, incomplete removal and infection. Duration Of Freeze Thaw-Cycle (Seconds): 2 Show Applicator Variable?: Yes Application Tool (Optional): Liquid Nitrogen Sprayer Number Of Freeze-Thaw Cycles: 1 freeze-thaw cycle Detail Level: Detailed

## 2024-12-07 ENCOUNTER — NON-APPOINTMENT (OUTPATIENT)
Age: 54
End: 2024-12-07

## 2024-12-09 ENCOUNTER — APPOINTMENT (OUTPATIENT)
Dept: INTERNAL MEDICINE | Facility: CLINIC | Age: 54
End: 2024-12-09

## 2024-12-09 ENCOUNTER — NON-APPOINTMENT (OUTPATIENT)
Age: 54
End: 2024-12-09

## 2024-12-16 ENCOUNTER — APPOINTMENT (OUTPATIENT)
Dept: INTERNAL MEDICINE | Facility: CLINIC | Age: 54
End: 2024-12-16
Payer: COMMERCIAL

## 2024-12-16 VITALS
OXYGEN SATURATION: 98 % | SYSTOLIC BLOOD PRESSURE: 135 MMHG | DIASTOLIC BLOOD PRESSURE: 80 MMHG | WEIGHT: 164 LBS | HEIGHT: 62 IN | RESPIRATION RATE: 16 BRPM | TEMPERATURE: 97.1 F | HEART RATE: 86 BPM | BODY MASS INDEX: 30.18 KG/M2

## 2024-12-16 DIAGNOSIS — K76.0 FATTY (CHANGE OF) LIVER, NOT ELSEWHERE CLASSIFIED: ICD-10-CM

## 2024-12-16 DIAGNOSIS — E11.9 TYPE 2 DIABETES MELLITUS W/OUT COMPLICATIONS: ICD-10-CM

## 2024-12-16 DIAGNOSIS — R74.01 ELEVATION OF LEVELS OF LIVER TRANSAMINASE LEVELS: ICD-10-CM

## 2024-12-16 DIAGNOSIS — I10 ESSENTIAL (PRIMARY) HYPERTENSION: ICD-10-CM

## 2024-12-16 DIAGNOSIS — M79.89 OTHER SPECIFIED SOFT TISSUE DISORDERS: ICD-10-CM

## 2024-12-16 PROCEDURE — 99214 OFFICE O/P EST MOD 30 MIN: CPT

## 2024-12-16 PROCEDURE — G2211 COMPLEX E/M VISIT ADD ON: CPT

## 2024-12-16 RX ORDER — SITAGLIPTIN 25 MG/1
25 TABLET, FILM COATED ORAL
Qty: 90 | Refills: 1 | Status: ACTIVE | COMMUNITY
Start: 2024-12-16 | End: 1900-01-01

## 2024-12-18 PROBLEM — M79.89 HAND SWELLING: Status: ACTIVE | Noted: 2024-12-16

## 2024-12-21 ENCOUNTER — RX RENEWAL (OUTPATIENT)
Age: 54
End: 2024-12-21

## 2024-12-26 ENCOUNTER — TRANSCRIPTION ENCOUNTER (OUTPATIENT)
Age: 54
End: 2024-12-26

## 2024-12-27 ENCOUNTER — RX RENEWAL (OUTPATIENT)
Age: 54
End: 2024-12-27

## 2025-01-10 ENCOUNTER — TRANSCRIPTION ENCOUNTER (OUTPATIENT)
Age: 55
End: 2025-01-10

## 2025-01-18 ENCOUNTER — APPOINTMENT (OUTPATIENT)
Dept: INTERNAL MEDICINE | Facility: CLINIC | Age: 55
End: 2025-01-18

## 2025-01-18 VITALS
TEMPERATURE: 98 F | DIASTOLIC BLOOD PRESSURE: 86 MMHG | RESPIRATION RATE: 16 BRPM | BODY MASS INDEX: 29.26 KG/M2 | HEIGHT: 62 IN | WEIGHT: 159 LBS | SYSTOLIC BLOOD PRESSURE: 155 MMHG | OXYGEN SATURATION: 96 % | HEART RATE: 90 BPM

## 2025-01-18 DIAGNOSIS — I10 ESSENTIAL (PRIMARY) HYPERTENSION: ICD-10-CM

## 2025-01-18 DIAGNOSIS — Z86.0100 PERSONAL HISTORY OF COLON POLYPS, UNSPECIFIED: ICD-10-CM

## 2025-01-18 DIAGNOSIS — K74.00 HEPATIC FIBROSIS, UNSPECIFIED: ICD-10-CM

## 2025-01-18 DIAGNOSIS — E11.9 TYPE 2 DIABETES MELLITUS W/OUT COMPLICATIONS: ICD-10-CM

## 2025-01-18 DIAGNOSIS — Z12.4 ENCOUNTER FOR SCREENING FOR MALIGNANT NEOPLASM OF CERVIX: ICD-10-CM

## 2025-01-18 PROCEDURE — 99396 PREV VISIT EST AGE 40-64: CPT

## 2025-03-03 ENCOUNTER — RX RENEWAL (OUTPATIENT)
Age: 55
End: 2025-03-03

## 2025-03-26 ENCOUNTER — APPOINTMENT (OUTPATIENT)
Dept: HEPATOLOGY | Facility: CLINIC | Age: 55
End: 2025-03-26
Payer: COMMERCIAL

## 2025-03-26 VITALS
OXYGEN SATURATION: 98 % | HEART RATE: 90 BPM | SYSTOLIC BLOOD PRESSURE: 151 MMHG | HEIGHT: 62 IN | BODY MASS INDEX: 29.08 KG/M2 | TEMPERATURE: 97.6 F | WEIGHT: 158 LBS | RESPIRATION RATE: 16 BRPM | DIASTOLIC BLOOD PRESSURE: 88 MMHG

## 2025-03-26 DIAGNOSIS — R74.01 ELEVATION OF LEVELS OF LIVER TRANSAMINASE LEVELS: ICD-10-CM

## 2025-03-26 DIAGNOSIS — K76.0 FATTY (CHANGE OF) LIVER, NOT ELSEWHERE CLASSIFIED: ICD-10-CM

## 2025-03-26 DIAGNOSIS — I10 ESSENTIAL (PRIMARY) HYPERTENSION: ICD-10-CM

## 2025-03-26 DIAGNOSIS — E78.5 HYPERLIPIDEMIA, UNSPECIFIED: ICD-10-CM

## 2025-03-26 DIAGNOSIS — K74.00 HEPATIC FIBROSIS, UNSPECIFIED: ICD-10-CM

## 2025-03-26 PROCEDURE — 99203 OFFICE O/P NEW LOW 30 MIN: CPT

## 2025-03-27 LAB
ALBUMIN SERPL ELPH-MCNC: 5 G/DL
ALP BLD-CCNC: 95 U/L
ALT SERPL-CCNC: 63 U/L
ANION GAP SERPL CALC-SCNC: 17 MMOL/L
AST SERPL-CCNC: 49 U/L
BASOPHILS # BLD AUTO: 0.04 K/UL
BASOPHILS NFR BLD AUTO: 0.8 %
BILIRUB SERPL-MCNC: 0.5 MG/DL
BUN SERPL-MCNC: 12 MG/DL
CALCIUM SERPL-MCNC: 9.8 MG/DL
CHLORIDE SERPL-SCNC: 105 MMOL/L
CHOLEST SERPL-MCNC: 151 MG/DL
CO2 SERPL-SCNC: 25 MMOL/L
CREAT SERPL-MCNC: 0.63 MG/DL
DEPRECATED KAPPA LC FREE/LAMBDA SER: 0.95 RATIO
EGFRCR SERPLBLD CKD-EPI 2021: 105 ML/MIN/1.73M2
EOSINOPHIL # BLD AUTO: 0.21 K/UL
EOSINOPHIL NFR BLD AUTO: 4.3 %
GLUCOSE SERPL-MCNC: 138 MG/DL
HBV CORE IGG+IGM SER QL: NONREACTIVE
HBV SURFACE AB SER QL: NONREACTIVE
HBV SURFACE AG SER QL: NONREACTIVE
HCT VFR BLD CALC: 43.2 %
HCV AB SER QL: NONREACTIVE
HCV S/CO RATIO: 0.11 S/CO
HDLC SERPL-MCNC: 50 MG/DL
HEPATITIS A IGG ANTIBODY: REACTIVE
HGB BLD-MCNC: 14.5 G/DL
IGA SER QL IEP: 215 MG/DL
IGG SER QL IEP: 1075 MG/DL
IGM SER QL IEP: 99 MG/DL
IMM GRANULOCYTES NFR BLD AUTO: 0.2 %
INR PPP: 0.92 RATIO
KAPPA LC CSF-MCNC: 1.79 MG/DL
KAPPA LC SERPL-MCNC: 1.7 MG/DL
LDLC SERPL-MCNC: 71 MG/DL
LYMPHOCYTES # BLD AUTO: 1.63 K/UL
LYMPHOCYTES NFR BLD AUTO: 33.5 %
MAN DIFF?: NORMAL
MCHC RBC-ENTMCNC: 31 PG
MCHC RBC-ENTMCNC: 33.6 G/DL
MCV RBC AUTO: 92.3 FL
MONOCYTES # BLD AUTO: 0.37 K/UL
MONOCYTES NFR BLD AUTO: 7.6 %
NEUTROPHILS # BLD AUTO: 2.61 K/UL
NEUTROPHILS NFR BLD AUTO: 53.6 %
NONHDLC SERPL-MCNC: 101 MG/DL
PLATELET # BLD AUTO: 235 K/UL
POTASSIUM SERPL-SCNC: 4.7 MMOL/L
PROT SERPL-MCNC: 7.6 G/DL
PT BLD: 10.9 SEC
RBC # BLD: 4.68 M/UL
RBC # FLD: 11.9 %
SODIUM SERPL-SCNC: 146 MMOL/L
TRIGL SERPL-MCNC: 178 MG/DL
TSH SERPL-ACNC: 2.45 UIU/ML
WBC # FLD AUTO: 4.87 K/UL

## 2025-03-28 ENCOUNTER — NON-APPOINTMENT (OUTPATIENT)
Age: 55
End: 2025-03-28

## 2025-03-28 LAB
MITOCHONDRIA AB SER IF-ACNC: NORMAL
SMOOTH MUSCLE AB SER QL IF: NORMAL

## 2025-03-29 ENCOUNTER — APPOINTMENT (OUTPATIENT)
Dept: INTERNAL MEDICINE | Facility: CLINIC | Age: 55
End: 2025-03-29

## 2025-03-31 LAB — ANA SER IF-ACNC: NEGATIVE

## 2025-04-26 ENCOUNTER — APPOINTMENT (OUTPATIENT)
Dept: MRI IMAGING | Facility: CLINIC | Age: 55
End: 2025-04-26
Payer: COMMERCIAL

## 2025-04-26 PROCEDURE — A9585: CPT

## 2025-04-26 PROCEDURE — 74183 MRI ABD W/O CNTR FLWD CNTR: CPT

## 2025-05-01 DIAGNOSIS — K76.0 FATTY (CHANGE OF) LIVER, NOT ELSEWHERE CLASSIFIED: ICD-10-CM

## 2025-05-01 DIAGNOSIS — R74.01 ELEVATION OF LEVELS OF LIVER TRANSAMINASE LEVELS: ICD-10-CM

## 2025-05-29 ENCOUNTER — RX RENEWAL (OUTPATIENT)
Age: 55
End: 2025-05-29

## 2025-05-31 ENCOUNTER — RX RENEWAL (OUTPATIENT)
Age: 55
End: 2025-05-31

## 2025-06-03 ENCOUNTER — RX RENEWAL (OUTPATIENT)
Age: 55
End: 2025-06-03

## 2025-06-25 ENCOUNTER — APPOINTMENT (OUTPATIENT)
Dept: HEPATOLOGY | Facility: CLINIC | Age: 55
End: 2025-06-25
Payer: COMMERCIAL

## 2025-06-25 VITALS
DIASTOLIC BLOOD PRESSURE: 73 MMHG | RESPIRATION RATE: 16 BRPM | HEIGHT: 62 IN | SYSTOLIC BLOOD PRESSURE: 135 MMHG | HEART RATE: 100 BPM | TEMPERATURE: 95 F | BODY MASS INDEX: 29.81 KG/M2 | WEIGHT: 162 LBS | OXYGEN SATURATION: 98 %

## 2025-06-25 PROCEDURE — 99214 OFFICE O/P EST MOD 30 MIN: CPT

## 2025-08-27 ENCOUNTER — APPOINTMENT (OUTPATIENT)
Dept: HEPATOLOGY | Facility: CLINIC | Age: 55
End: 2025-08-27